# Patient Record
Sex: FEMALE | Race: WHITE | Employment: OTHER | URBAN - METROPOLITAN AREA
[De-identification: names, ages, dates, MRNs, and addresses within clinical notes are randomized per-mention and may not be internally consistent; named-entity substitution may affect disease eponyms.]

---

## 2021-10-25 ENCOUNTER — LAB ENCOUNTER (OUTPATIENT)
Dept: LAB | Facility: HOSPITAL | Age: 71
DRG: 455 | End: 2021-10-25
Attending: ORTHOPAEDIC SURGERY
Payer: MEDICARE

## 2021-10-25 DIAGNOSIS — Z01.818 PREOP TESTING: ICD-10-CM

## 2021-10-25 PROCEDURE — 86850 RBC ANTIBODY SCREEN: CPT

## 2021-10-25 PROCEDURE — 36415 COLL VENOUS BLD VENIPUNCTURE: CPT

## 2021-10-25 PROCEDURE — 86900 BLOOD TYPING SEROLOGIC ABO: CPT

## 2021-10-25 PROCEDURE — 87641 MR-STAPH DNA AMP PROBE: CPT

## 2021-10-25 PROCEDURE — 86901 BLOOD TYPING SEROLOGIC RH(D): CPT

## 2021-10-25 RX ORDER — SERTRALINE HYDROCHLORIDE 100 MG/1
100 TABLET, FILM COATED ORAL EVERY MORNING
COMMUNITY

## 2021-10-25 RX ORDER — TRAMADOL HYDROCHLORIDE 50 MG/1
50 TABLET ORAL EVERY 6 HOURS PRN
COMMUNITY

## 2021-10-25 RX ORDER — PREGABALIN 75 MG/1
75 CAPSULE ORAL 2 TIMES DAILY
COMMUNITY

## 2021-10-25 RX ORDER — SUMATRIPTAN 100 MG/1
100 TABLET, FILM COATED ORAL EVERY 2 HOUR PRN
COMMUNITY

## 2021-10-25 RX ORDER — ALENDRONATE SODIUM 70 MG/1
70 TABLET ORAL
COMMUNITY

## 2021-10-25 RX ORDER — HYDROXYZINE 50 MG/1
50 TABLET, FILM COATED ORAL 3 TIMES DAILY PRN
COMMUNITY
End: 2021-12-07

## 2021-10-25 RX ORDER — LOVASTATIN 20 MG/1
20 TABLET ORAL NIGHTLY
COMMUNITY

## 2021-10-26 NOTE — PAT NURSING NOTE
LM with Abundio Finney at Dr Janelle Nicole [facility] at  re: medical clearance and cardiac clearance needed prior to surgery tomorrow. Pls see notes of Dr Tracy Lindquist on 9/29/21 at [facility].

## 2021-10-27 ENCOUNTER — ANESTHESIA (OUTPATIENT)
Dept: SURGERY | Facility: HOSPITAL | Age: 71
DRG: 455 | End: 2021-10-27
Payer: MEDICARE

## 2021-10-27 ENCOUNTER — HOSPITAL ENCOUNTER (INPATIENT)
Facility: HOSPITAL | Age: 71
LOS: 3 days | Discharge: HOME | DRG: 455 | End: 2021-10-30
Attending: ORTHOPAEDIC SURGERY | Admitting: ORTHOPAEDIC SURGERY
Payer: MEDICARE

## 2021-10-27 ENCOUNTER — APPOINTMENT (OUTPATIENT)
Dept: GENERAL RADIOLOGY | Facility: HOSPITAL | Age: 71
DRG: 455 | End: 2021-10-27
Attending: ORTHOPAEDIC SURGERY
Payer: MEDICARE

## 2021-10-27 ENCOUNTER — ANESTHESIA EVENT (OUTPATIENT)
Dept: SURGERY | Facility: HOSPITAL | Age: 71
DRG: 455 | End: 2021-10-27
Payer: MEDICARE

## 2021-10-27 DIAGNOSIS — Z01.818 PREOP TESTING: Primary | ICD-10-CM

## 2021-10-27 PROBLEM — Z98.1 S/P LUMBAR FUSION: Status: ACTIVE | Noted: 2021-10-27

## 2021-10-27 PROBLEM — E78.5 HYPERLIPIDEMIA: Chronic | Status: ACTIVE | Noted: 2021-10-27

## 2021-10-27 PROBLEM — M43.16 SPONDYLOLISTHESIS OF LUMBAR REGION: Status: ACTIVE | Noted: 2021-10-27

## 2021-10-27 PROCEDURE — 76000 FLUOROSCOPY <1 HR PHYS/QHP: CPT | Performed by: ORTHOPAEDIC SURGERY

## 2021-10-27 PROCEDURE — BR191ZZ FLUOROSCOPY OF LUMBAR SPINE USING LOW OSMOLAR CONTRAST: ICD-10-PCS | Performed by: ORTHOPAEDIC SURGERY

## 2021-10-27 PROCEDURE — 0ST20ZZ RESECTION OF LUMBAR VERTEBRAL DISC, OPEN APPROACH: ICD-10-PCS | Performed by: ORTHOPAEDIC SURGERY

## 2021-10-27 PROCEDURE — 99232 SBSQ HOSP IP/OBS MODERATE 35: CPT | Performed by: HOSPITALIST

## 2021-10-27 PROCEDURE — 4A11X4G MONITORING OF PERIPHERAL NERVOUS ELECTRICAL ACTIVITY, INTRAOPERATIVE, EXTERNAL APPROACH: ICD-10-PCS | Performed by: ORTHOPAEDIC SURGERY

## 2021-10-27 PROCEDURE — 22558 ARTHRD ANT NTRBD MIN DSC LUM: CPT | Performed by: SURGERY

## 2021-10-27 PROCEDURE — 0SG00A0 FUSION OF LUMBAR VERTEBRAL JOINT WITH INTERBODY FUSION DEVICE, ANTERIOR APPROACH, ANTERIOR COLUMN, OPEN APPROACH: ICD-10-PCS | Performed by: ORTHOPAEDIC SURGERY

## 2021-10-27 PROCEDURE — 3E0U0GB INTRODUCTION OF RECOMBINANT BONE MORPHOGENETIC PROTEIN INTO JOINTS, OPEN APPROACH: ICD-10-PCS | Performed by: ORTHOPAEDIC SURGERY

## 2021-10-27 PROCEDURE — 0SG00K1 FUSION OF LUMBAR VERTEBRAL JOINT WITH NONAUTOLOGOUS TISSUE SUBSTITUTE, POSTERIOR APPROACH, POSTERIOR COLUMN, OPEN APPROACH: ICD-10-PCS | Performed by: ORTHOPAEDIC SURGERY

## 2021-10-27 PROCEDURE — 01NB0ZZ RELEASE LUMBAR NERVE, OPEN APPROACH: ICD-10-PCS | Performed by: ORTHOPAEDIC SURGERY

## 2021-10-27 DEVICE — RELINE MAS RED SCREW 6.5X40 2C: Type: IMPLANTABLE DEVICE | Site: BACK | Status: FUNCTIONAL

## 2021-10-27 DEVICE — ATTRAX® PUTTY, 10CC US
Type: IMPLANTABLE DEVICE | Site: BACK | Status: FUNCTIONAL
Brand: ATTRAX

## 2021-10-27 DEVICE — RELINE MAS RED SCREW 6.5X45 2C: Type: IMPLANTABLE DEVICE | Site: BACK | Status: FUNCTIONAL

## 2021-10-27 DEVICE — BONE GRAFT KIT 7510050 INFUSE XX SMALL
Type: IMPLANTABLE DEVICE | Site: BACK | Status: FUNCTIONAL
Brand: INFUSE® BONE GRAFT

## 2021-10-27 DEVICE — MODULUS ALIF, 8X34X24MM 15°
Type: IMPLANTABLE DEVICE | Site: BACK | Status: FUNCTIONAL
Brand: MODULUS

## 2021-10-27 DEVICE — RELINE MAS TI ROD 5.5X35 LRDTC: Type: IMPLANTABLE DEVICE | Site: BACK | Status: FUNCTIONAL

## 2021-10-27 DEVICE — RELINE LCK SCRW 5.5 OPEN TULIP: Type: IMPLANTABLE DEVICE | Site: BACK | Status: FUNCTIONAL

## 2021-10-27 RX ORDER — PREGABALIN 75 MG/1
75 CAPSULE ORAL 2 TIMES DAILY
Status: DISCONTINUED | OUTPATIENT
Start: 2021-10-27 | End: 2021-10-30

## 2021-10-27 RX ORDER — SERTRALINE HYDROCHLORIDE 100 MG/1
100 TABLET, FILM COATED ORAL EVERY MORNING
Status: DISCONTINUED | OUTPATIENT
Start: 2021-10-27 | End: 2021-10-30

## 2021-10-27 RX ORDER — HYDROMORPHONE HYDROCHLORIDE 1 MG/ML
0.6 INJECTION, SOLUTION INTRAMUSCULAR; INTRAVENOUS; SUBCUTANEOUS EVERY 5 MIN PRN
Status: DISCONTINUED | OUTPATIENT
Start: 2021-10-27 | End: 2021-10-27 | Stop reason: HOSPADM

## 2021-10-27 RX ORDER — ONDANSETRON 2 MG/ML
4 INJECTION INTRAMUSCULAR; INTRAVENOUS EVERY 4 HOURS PRN
Status: ACTIVE | OUTPATIENT
Start: 2021-10-27 | End: 2021-10-28

## 2021-10-27 RX ORDER — ONDANSETRON 2 MG/ML
4 INJECTION INTRAMUSCULAR; INTRAVENOUS ONCE AS NEEDED
Status: DISCONTINUED | OUTPATIENT
Start: 2021-10-27 | End: 2021-10-27 | Stop reason: HOSPADM

## 2021-10-27 RX ORDER — MIDAZOLAM HYDROCHLORIDE 1 MG/ML
INJECTION INTRAMUSCULAR; INTRAVENOUS AS NEEDED
Status: DISCONTINUED | OUTPATIENT
Start: 2021-10-27 | End: 2021-10-27 | Stop reason: SURG

## 2021-10-27 RX ORDER — MORPHINE SULFATE 4 MG/ML
2 INJECTION, SOLUTION INTRAMUSCULAR; INTRAVENOUS EVERY 10 MIN PRN
Status: DISCONTINUED | OUTPATIENT
Start: 2021-10-27 | End: 2021-10-27 | Stop reason: HOSPADM

## 2021-10-27 RX ORDER — HYDROMORPHONE HYDROCHLORIDE 1 MG/ML
0.2 INJECTION, SOLUTION INTRAMUSCULAR; INTRAVENOUS; SUBCUTANEOUS EVERY 5 MIN PRN
Status: DISCONTINUED | OUTPATIENT
Start: 2021-10-27 | End: 2021-10-27 | Stop reason: HOSPADM

## 2021-10-27 RX ORDER — LIDOCAINE HYDROCHLORIDE 10 MG/ML
INJECTION, SOLUTION EPIDURAL; INFILTRATION; INTRACAUDAL; PERINEURAL AS NEEDED
Status: DISCONTINUED | OUTPATIENT
Start: 2021-10-27 | End: 2021-10-27 | Stop reason: SURG

## 2021-10-27 RX ORDER — GLYCOPYRROLATE 0.2 MG/ML
INJECTION, SOLUTION INTRAMUSCULAR; INTRAVENOUS AS NEEDED
Status: DISCONTINUED | OUTPATIENT
Start: 2021-10-27 | End: 2021-10-27 | Stop reason: SURG

## 2021-10-27 RX ORDER — PRAVASTATIN SODIUM 20 MG
20 TABLET ORAL NIGHTLY
Status: DISCONTINUED | OUTPATIENT
Start: 2021-10-27 | End: 2021-10-30

## 2021-10-27 RX ORDER — DOXEPIN HYDROCHLORIDE 50 MG/1
1 CAPSULE ORAL EVERY MORNING
Status: DISCONTINUED | OUTPATIENT
Start: 2021-10-27 | End: 2021-10-30

## 2021-10-27 RX ORDER — ROCURONIUM BROMIDE 10 MG/ML
INJECTION, SOLUTION INTRAVENOUS AS NEEDED
Status: DISCONTINUED | OUTPATIENT
Start: 2021-10-27 | End: 2021-10-27 | Stop reason: SURG

## 2021-10-27 RX ORDER — SODIUM CHLORIDE, SODIUM LACTATE, POTASSIUM CHLORIDE, CALCIUM CHLORIDE 600; 310; 30; 20 MG/100ML; MG/100ML; MG/100ML; MG/100ML
INJECTION, SOLUTION INTRAVENOUS CONTINUOUS
Status: DISCONTINUED | OUTPATIENT
Start: 2021-10-27 | End: 2021-10-28

## 2021-10-27 RX ORDER — MORPHINE SULFATE 4 MG/ML
4 INJECTION, SOLUTION INTRAMUSCULAR; INTRAVENOUS EVERY 10 MIN PRN
Status: DISCONTINUED | OUTPATIENT
Start: 2021-10-27 | End: 2021-10-27 | Stop reason: HOSPADM

## 2021-10-27 RX ORDER — PHENYLEPHRINE HCL 10 MG/ML
VIAL (ML) INJECTION AS NEEDED
Status: DISCONTINUED | OUTPATIENT
Start: 2021-10-27 | End: 2021-10-27 | Stop reason: SURG

## 2021-10-27 RX ORDER — BISACODYL 10 MG
10 SUPPOSITORY, RECTAL RECTAL
Status: DISCONTINUED | OUTPATIENT
Start: 2021-10-27 | End: 2021-10-30

## 2021-10-27 RX ORDER — HYDROMORPHONE HYDROCHLORIDE 1 MG/ML
0.4 INJECTION, SOLUTION INTRAMUSCULAR; INTRAVENOUS; SUBCUTANEOUS EVERY 5 MIN PRN
Status: DISCONTINUED | OUTPATIENT
Start: 2021-10-27 | End: 2021-10-27 | Stop reason: HOSPADM

## 2021-10-27 RX ORDER — NEOSTIGMINE METHYLSULFATE 1 MG/ML
INJECTION INTRAVENOUS AS NEEDED
Status: DISCONTINUED | OUTPATIENT
Start: 2021-10-27 | End: 2021-10-27 | Stop reason: SURG

## 2021-10-27 RX ORDER — HYDROCODONE BITARTRATE AND ACETAMINOPHEN 5; 325 MG/1; MG/1
2 TABLET ORAL AS NEEDED
Status: DISCONTINUED | OUTPATIENT
Start: 2021-10-27 | End: 2021-10-27 | Stop reason: HOSPADM

## 2021-10-27 RX ORDER — DIPHENHYDRAMINE HYDROCHLORIDE 50 MG/ML
25 INJECTION INTRAMUSCULAR; INTRAVENOUS EVERY 4 HOURS PRN
Status: DISCONTINUED | OUTPATIENT
Start: 2021-10-27 | End: 2021-10-30

## 2021-10-27 RX ORDER — SODIUM CHLORIDE 9 MG/ML
INJECTION, SOLUTION INTRAVENOUS CONTINUOUS PRN
Status: DISCONTINUED | OUTPATIENT
Start: 2021-10-27 | End: 2021-10-27 | Stop reason: SURG

## 2021-10-27 RX ORDER — ACETAMINOPHEN 325 MG/1
650 TABLET ORAL EVERY 4 HOURS PRN
Status: DISCONTINUED | OUTPATIENT
Start: 2021-10-27 | End: 2021-10-30

## 2021-10-27 RX ORDER — HYDROMORPHONE HYDROCHLORIDE 1 MG/ML
0.2 INJECTION, SOLUTION INTRAMUSCULAR; INTRAVENOUS; SUBCUTANEOUS EVERY 2 HOUR PRN
Status: DISCONTINUED | OUTPATIENT
Start: 2021-10-27 | End: 2021-10-30

## 2021-10-27 RX ORDER — HYDROMORPHONE HYDROCHLORIDE 1 MG/ML
0.8 INJECTION, SOLUTION INTRAMUSCULAR; INTRAVENOUS; SUBCUTANEOUS EVERY 2 HOUR PRN
Status: DISCONTINUED | OUTPATIENT
Start: 2021-10-27 | End: 2021-10-30

## 2021-10-27 RX ORDER — SENNOSIDES 8.6 MG
17.2 TABLET ORAL NIGHTLY
Status: DISCONTINUED | OUTPATIENT
Start: 2021-10-27 | End: 2021-10-30

## 2021-10-27 RX ORDER — NALOXONE HYDROCHLORIDE 0.4 MG/ML
80 INJECTION, SOLUTION INTRAMUSCULAR; INTRAVENOUS; SUBCUTANEOUS AS NEEDED
Status: DISCONTINUED | OUTPATIENT
Start: 2021-10-27 | End: 2021-10-27 | Stop reason: HOSPADM

## 2021-10-27 RX ORDER — PROCHLORPERAZINE EDISYLATE 5 MG/ML
5 INJECTION INTRAMUSCULAR; INTRAVENOUS ONCE AS NEEDED
Status: DISCONTINUED | OUTPATIENT
Start: 2021-10-27 | End: 2021-10-27 | Stop reason: HOSPADM

## 2021-10-27 RX ORDER — SODIUM CHLORIDE 9 MG/ML
INJECTION, SOLUTION INTRAVENOUS CONTINUOUS
Status: DISCONTINUED | OUTPATIENT
Start: 2021-10-27 | End: 2021-10-30

## 2021-10-27 RX ORDER — ACETAMINOPHEN 500 MG
1000 TABLET ORAL ONCE
Status: COMPLETED | OUTPATIENT
Start: 2021-10-27 | End: 2021-10-27

## 2021-10-27 RX ORDER — KETAMINE HYDROCHLORIDE 50 MG/ML
INJECTION, SOLUTION, CONCENTRATE INTRAMUSCULAR; INTRAVENOUS AS NEEDED
Status: DISCONTINUED | OUTPATIENT
Start: 2021-10-27 | End: 2021-10-27 | Stop reason: SURG

## 2021-10-27 RX ORDER — PROCHLORPERAZINE EDISYLATE 5 MG/ML
10 INJECTION INTRAMUSCULAR; INTRAVENOUS EVERY 6 HOURS PRN
Status: ACTIVE | OUTPATIENT
Start: 2021-10-27 | End: 2021-10-29

## 2021-10-27 RX ORDER — DEXAMETHASONE SODIUM PHOSPHATE 4 MG/ML
VIAL (ML) INJECTION AS NEEDED
Status: DISCONTINUED | OUTPATIENT
Start: 2021-10-27 | End: 2021-10-27 | Stop reason: SURG

## 2021-10-27 RX ORDER — EPHEDRINE SULFATE 50 MG/ML
INJECTION, SOLUTION INTRAVENOUS AS NEEDED
Status: DISCONTINUED | OUTPATIENT
Start: 2021-10-27 | End: 2021-10-27 | Stop reason: SURG

## 2021-10-27 RX ORDER — CEFAZOLIN SODIUM/WATER 2 G/20 ML
2 SYRINGE (ML) INTRAVENOUS ONCE
Status: COMPLETED | OUTPATIENT
Start: 2021-10-27 | End: 2021-10-27

## 2021-10-27 RX ORDER — ONDANSETRON 2 MG/ML
INJECTION INTRAMUSCULAR; INTRAVENOUS AS NEEDED
Status: DISCONTINUED | OUTPATIENT
Start: 2021-10-27 | End: 2021-10-27 | Stop reason: SURG

## 2021-10-27 RX ORDER — HYDROCODONE BITARTRATE AND ACETAMINOPHEN 10; 325 MG/1; MG/1
1 TABLET ORAL EVERY 4 HOURS PRN
Status: DISCONTINUED | OUTPATIENT
Start: 2021-10-27 | End: 2021-10-30

## 2021-10-27 RX ORDER — DOCUSATE SODIUM 100 MG/1
100 CAPSULE, LIQUID FILLED ORAL 2 TIMES DAILY
Status: DISCONTINUED | OUTPATIENT
Start: 2021-10-27 | End: 2021-10-30

## 2021-10-27 RX ORDER — HALOPERIDOL 5 MG/ML
0.25 INJECTION INTRAMUSCULAR ONCE AS NEEDED
Status: DISCONTINUED | OUTPATIENT
Start: 2021-10-27 | End: 2021-10-27 | Stop reason: HOSPADM

## 2021-10-27 RX ORDER — HYDROXYZINE HYDROCHLORIDE 25 MG/1
50 TABLET, FILM COATED ORAL 3 TIMES DAILY PRN
Status: DISCONTINUED | OUTPATIENT
Start: 2021-10-27 | End: 2021-10-30

## 2021-10-27 RX ORDER — POLYETHYLENE GLYCOL 3350 17 G/17G
17 POWDER, FOR SOLUTION ORAL DAILY PRN
Status: DISCONTINUED | OUTPATIENT
Start: 2021-10-27 | End: 2021-10-30

## 2021-10-27 RX ORDER — SODIUM PHOSPHATE, DIBASIC AND SODIUM PHOSPHATE, MONOBASIC 7; 19 G/133ML; G/133ML
1 ENEMA RECTAL ONCE AS NEEDED
Status: DISCONTINUED | OUTPATIENT
Start: 2021-10-27 | End: 2021-10-30

## 2021-10-27 RX ORDER — MORPHINE SULFATE 10 MG/ML
6 INJECTION, SOLUTION INTRAMUSCULAR; INTRAVENOUS EVERY 10 MIN PRN
Status: DISCONTINUED | OUTPATIENT
Start: 2021-10-27 | End: 2021-10-27 | Stop reason: HOSPADM

## 2021-10-27 RX ORDER — BUPIVACAINE HYDROCHLORIDE 5 MG/ML
INJECTION, SOLUTION EPIDURAL; INTRACAUDAL AS NEEDED
Status: DISCONTINUED | OUTPATIENT
Start: 2021-10-27 | End: 2021-10-27 | Stop reason: HOSPADM

## 2021-10-27 RX ORDER — DIPHENHYDRAMINE HCL 25 MG
25 CAPSULE ORAL EVERY 4 HOURS PRN
Status: DISCONTINUED | OUTPATIENT
Start: 2021-10-27 | End: 2021-10-30

## 2021-10-27 RX ORDER — CEFAZOLIN SODIUM/WATER 2 G/20 ML
2 SYRINGE (ML) INTRAVENOUS EVERY 8 HOURS
Status: COMPLETED | OUTPATIENT
Start: 2021-10-27 | End: 2021-10-28

## 2021-10-27 RX ORDER — HYDROCODONE BITARTRATE AND ACETAMINOPHEN 5; 325 MG/1; MG/1
1 TABLET ORAL AS NEEDED
Status: DISCONTINUED | OUTPATIENT
Start: 2021-10-27 | End: 2021-10-27 | Stop reason: HOSPADM

## 2021-10-27 RX ORDER — SODIUM CHLORIDE, SODIUM LACTATE, POTASSIUM CHLORIDE, CALCIUM CHLORIDE 600; 310; 30; 20 MG/100ML; MG/100ML; MG/100ML; MG/100ML
INJECTION, SOLUTION INTRAVENOUS CONTINUOUS
Status: DISCONTINUED | OUTPATIENT
Start: 2021-10-27 | End: 2021-10-27 | Stop reason: HOSPADM

## 2021-10-27 RX ORDER — HYDROCODONE BITARTRATE AND ACETAMINOPHEN 10; 325 MG/1; MG/1
2 TABLET ORAL EVERY 4 HOURS PRN
Status: DISCONTINUED | OUTPATIENT
Start: 2021-10-27 | End: 2021-10-30

## 2021-10-27 RX ORDER — CYCLOBENZAPRINE HCL 5 MG
5 TABLET ORAL 3 TIMES DAILY PRN
Status: DISCONTINUED | OUTPATIENT
Start: 2021-10-27 | End: 2021-10-30

## 2021-10-27 RX ORDER — HYDROMORPHONE HYDROCHLORIDE 1 MG/ML
0.4 INJECTION, SOLUTION INTRAMUSCULAR; INTRAVENOUS; SUBCUTANEOUS EVERY 2 HOUR PRN
Status: DISCONTINUED | OUTPATIENT
Start: 2021-10-27 | End: 2021-10-30

## 2021-10-27 RX ADMIN — DEXAMETHASONE SODIUM PHOSPHATE 4 MG: 4 MG/ML VIAL (ML) INJECTION at 08:08:00

## 2021-10-27 RX ADMIN — ROCURONIUM BROMIDE 40 MG: 10 INJECTION, SOLUTION INTRAVENOUS at 07:51:00

## 2021-10-27 RX ADMIN — ONDANSETRON 4 MG: 2 INJECTION INTRAMUSCULAR; INTRAVENOUS at 11:04:00

## 2021-10-27 RX ADMIN — SODIUM CHLORIDE, SODIUM LACTATE, POTASSIUM CHLORIDE, CALCIUM CHLORIDE: 600; 310; 30; 20 INJECTION, SOLUTION INTRAVENOUS at 07:42:00

## 2021-10-27 RX ADMIN — KETAMINE HYDROCHLORIDE 30 MG: 50 INJECTION, SOLUTION, CONCENTRATE INTRAMUSCULAR; INTRAVENOUS at 08:08:00

## 2021-10-27 RX ADMIN — GLYCOPYRROLATE 0.5 MG: 0.2 INJECTION, SOLUTION INTRAMUSCULAR; INTRAVENOUS at 09:59:00

## 2021-10-27 RX ADMIN — SODIUM CHLORIDE: 9 INJECTION, SOLUTION INTRAVENOUS at 09:35:00

## 2021-10-27 RX ADMIN — KETAMINE HYDROCHLORIDE 15 MG: 50 INJECTION, SOLUTION, CONCENTRATE INTRAMUSCULAR; INTRAVENOUS at 10:22:00

## 2021-10-27 RX ADMIN — SODIUM CHLORIDE, SODIUM LACTATE, POTASSIUM CHLORIDE, CALCIUM CHLORIDE: 600; 310; 30; 20 INJECTION, SOLUTION INTRAVENOUS at 11:27:00

## 2021-10-27 RX ADMIN — PHENYLEPHRINE HCL 100 MCG: 10 MG/ML VIAL (ML) INJECTION at 10:40:00

## 2021-10-27 RX ADMIN — PHENYLEPHRINE HCL 50 MCG: 10 MG/ML VIAL (ML) INJECTION at 09:32:00

## 2021-10-27 RX ADMIN — EPHEDRINE SULFATE 10 MG: 50 INJECTION, SOLUTION INTRAVENOUS at 07:53:00

## 2021-10-27 RX ADMIN — EPHEDRINE SULFATE 5 MG: 50 INJECTION, SOLUTION INTRAVENOUS at 09:52:00

## 2021-10-27 RX ADMIN — CEFAZOLIN SODIUM/WATER 2 G: 2 G/20 ML SYRINGE (ML) INTRAVENOUS at 11:27:00

## 2021-10-27 RX ADMIN — ROCURONIUM BROMIDE 10 MG: 10 INJECTION, SOLUTION INTRAVENOUS at 09:07:00

## 2021-10-27 RX ADMIN — LIDOCAINE HYDROCHLORIDE 50 MG: 10 INJECTION, SOLUTION EPIDURAL; INFILTRATION; INTRACAUDAL; PERINEURAL at 07:50:00

## 2021-10-27 RX ADMIN — GLYCOPYRROLATE 0.1 MG: 0.2 INJECTION, SOLUTION INTRAMUSCULAR; INTRAVENOUS at 08:38:00

## 2021-10-27 RX ADMIN — CEFAZOLIN SODIUM/WATER 2 G: 2 G/20 ML SYRINGE (ML) INTRAVENOUS at 07:47:00

## 2021-10-27 RX ADMIN — SODIUM CHLORIDE, SODIUM LACTATE, POTASSIUM CHLORIDE, CALCIUM CHLORIDE: 600; 310; 30; 20 INJECTION, SOLUTION INTRAVENOUS at 09:35:00

## 2021-10-27 RX ADMIN — PHENYLEPHRINE HCL 100 MCG: 10 MG/ML VIAL (ML) INJECTION at 09:52:00

## 2021-10-27 RX ADMIN — MIDAZOLAM HYDROCHLORIDE 1 MG: 1 INJECTION INTRAMUSCULAR; INTRAVENOUS at 07:42:00

## 2021-10-27 RX ADMIN — EPHEDRINE SULFATE 5 MG: 50 INJECTION, SOLUTION INTRAVENOUS at 09:17:00

## 2021-10-27 RX ADMIN — EPHEDRINE SULFATE 5 MG: 50 INJECTION, SOLUTION INTRAVENOUS at 08:29:00

## 2021-10-27 RX ADMIN — NEOSTIGMINE METHYLSULFATE 2.8 MG: 1 INJECTION INTRAVENOUS at 09:59:00

## 2021-10-27 RX ADMIN — MIDAZOLAM HYDROCHLORIDE 2 MG: 1 INJECTION INTRAMUSCULAR; INTRAVENOUS at 10:31:00

## 2021-10-27 RX ADMIN — SODIUM CHLORIDE: 9 INJECTION, SOLUTION INTRAVENOUS at 07:56:00

## 2021-10-27 NOTE — OPERATIVE REPORT
Date of Surgery  10/27/21     Preoperative Diagnosis   Lumbar degenerative disc disease  Lumbar spondylolisthesis L4-5  Lumbar spinal stenosis  Lumbar radiculopathy  Low back pain     Postoperative Diagnosis   Lumbar degenerative disc disease  Lumbar spond disease, need for future surgery, continued pain, cardiopulmonary complications, blood clot, pulmonary embolism, stroke, adverse reaction to anesthesia, death, blindness, infection, seroma, hematoma. All questions were answered. No guarantees were given. padded. A timeout was preformed for Stage I identifying the correct patient, procedure, site and side. Dr. Yanick Causey performed the anterior retroperitoneal exposure which will be dictated separately.  Once the L4-5 disc space was confirmed to be exposed in the o foramen and spinal canal and thus direct open decompressive laminectomy was not chosen to be preformed at this time. The patient was then carefully turned to the prone position onto an [place] spine table for Stage II.  All bony prominences were well pad body. Once the wires were placed in the left L4 and L5 levels we then proceed to the right side. A #15 scalpel blade was used to make a longitudinal incision from the level of the L4 to the L5 pedicle thru the skin now on the right.  The dissection was cruz repaired using 0 Vicryl suture. The deep and superficial fascial layers were closed using 2-0 Vicryl suture. The skin was closed with 3-0 Monocryl running subcuticular suture. Skin glue and a sterile dressing was applied.  The patient tolerated the procedur

## 2021-10-27 NOTE — BRIEF OP NOTE
Pre-Operative Diagnosis: Lumbar radiculopathy     Post-Operative Diagnosis: Lumbar radiculopathy      Procedure Performed:   L4-5 anterior lumbar interbody fusion with L4-5 posterior instrumentation and fusion      Surgeon(s) and Role:     * aPco Swanson

## 2021-10-27 NOTE — ANESTHESIA PROCEDURE NOTES
Airway  Date/Time: 10/27/2021 7:53 AM  Urgency: Elective      General Information and Staff    Patient location during procedure: OR  Anesthesiologist: Savannah Guerin MD  Resident/CRNA: Pito Lee CRNA  Performed: CRNA     Indications and Patient Con

## 2021-10-27 NOTE — PLAN OF CARE
Patient alert and oriented x4. Pain controlled with meds from PACU. Castillo catheter in place. Abdominal and lumbar surgical dressings clean dry and intact. Patient educated on cough and deep breathe and proper safety protocols. SCDs in place.  Spouse at beds of cardiac arrhythmias or at baseline  Description: INTERVENTIONS:  - Continuous cardiac monitoring, monitor vital signs, obtain 12 lead EKG if indicated  - Evaluate effectiveness of antiarrhythmic and heart rate control medications as ordered  - Initiate transferring and ambulating w/ or w/o assistive devices  - Assist with transfers and ambulation using safe patient handling equipment as needed  - Ensure adequate protection for wounds/incisions during mobilization  - Obtain PT/OT consults as needed  - Adv precautions as indicated by assessment.  - Educate pt/family on patient safety including physical limitations  - Instruct pt to call for assistance with activity based on assessment  - Modify environment to reduce risk of injury  - Provide assistive device

## 2021-10-27 NOTE — ANESTHESIA POSTPROCEDURE EVALUATION
Patient: Francisco Tompkins    Procedure Summary     Date: 10/27/21 Room / Location: [address] [place] [address] MAIN OR 12 / [address] [place] [address] MAIN OR    Anesthesia Start: 4692 Anesthesia Stop: 6460    Procedures:       L4-5 anterior lumbar interbody fusion with posterior instrumentation

## 2021-10-27 NOTE — ANESTHESIA PREPROCEDURE EVALUATION
Anesthesia PreOp Note    HPI:     Rosy Cesar is a 66year old female who presents for preoperative consultation requested by: Kimberley Avila DO    Date of Surgery: 10/27/2021    Procedure(s):  L4-5 anterior lumbar interbody fusion wi every 6 (six) hours as needed for Pain., Disp: , Rfl: , 10/24/2021  hydrOXYzine 50 MG Oral Tab, Take 50 mg by mouth 3 (three) times daily as needed for Itching (Nausea). , Disp: , Rfl: , More than a month at Unknown time  Multiple Vitamin (MULTIVITAMIN TAB/     Days of Exercise per Week: Not on file      Minutes of Exercise per Session: Not on file  Stress:       Feeling of Stress : Not on file  Social Connections:       Frequency of Communication with Friends and Family: Not on file      Frequency of Socia Abdominal  - normal exam    Abdomen: soft.   Bowel sounds: normal.             Anesthesia Plan:   ASA:  2  Plan:   General  Monitors and Lines:   Additonal IV  Airway:  ETT  Informed Consent Plan and Risks Discussed With:  Patient and spouse  Discussed plan

## 2021-10-27 NOTE — PHYSICAL THERAPY NOTE
PHYSICAL THERAPY EVALUATION - INPATIENT     Room Number: 407/407-A  Evaluation Date: 10/27/2021  Type of Evaluation: Initial   Physician Order: PT Eval and Treat    Presenting Problem: L4-5 Anterior & Posterior fusion  Reason for Therapy: Mobility Dysfunc training;Strengthening;Neuromuscular re-educate  Rehab Potential : Fair  Frequency (Obs): BID       PHYSICAL THERAPY MEDICAL/SOCIAL HISTORY     History related to current admission: Per H&P: \"Sherin Poole is a(n) 66year old female.  Patient was  for LE dressing. She amb w/o AD. She stopped driving during [place]. She needs to get surgery on her L hand. She does yoga. SUBJECTIVE  Pt does not want to use RW at home.      PHYSICAL THERAPY EXAMINATION     OBJECTIVE  Precautions: Spine  Fall Ri ABILITY STATUS  Gait Assessment   Gait Assistance: Minimum assistance  Distance (ft): 40  Assistive Device: Rolling walker     Stoop/Curb Assistance: Not tested       Bed Mobility: CGA    Transfers: Min A    Exercise/Education Provided:  Bed mobility  Body

## 2021-10-27 NOTE — PROGRESS NOTES
[facility]    Progress Note    Gabriel Cosme Patient Status:  Inpatient    1950 MRN [id number]   [facility] Attending Aimee Gaytan [address] Day # 0 PCP Jose Garcia, BILT, TP, AST, ALT, PTT, INR, PT, T4F, TSH, TSHREFLEX, NARCISO, LIP, GGT, PSA, DDIMER, ESRML, ESRPF, CRP, BNP, MG, PHOS, TROP, CK, CKMB, JUANPABLO, RPR, B12, ETOH, POCGLU    XR FLUOROSCOPY C-ARM TIME <1 HOUR  (CPT=76000)    Result Date: 10/27/2021  CONCLUSION:   FLU

## 2021-10-27 NOTE — ANESTHESIA PREPROCEDURE EVALUATION
Anesthesia PreOp Note    HPI:     Holly Puentes is a 66year old female who presents for preoperative consultation requested by: Julita Richardson DO    Date of Surgery: 10/27/2021    Procedure(s):  L4-5 anterior lumbar interbody fusion wi every 6 (six) hours as needed for Pain., Disp: , Rfl: , 10/24/2021  hydrOXYzine 50 MG Oral Tab, Take 50 mg by mouth 3 (three) times daily as needed for Itching (Nausea). , Disp: , Rfl: , More than a month at Unknown time  Multiple Vitamin (MULTIVITAMIN TAB/     Days of Exercise per Week: Not on file      Minutes of Exercise per Session: Not on file  Stress:       Feeling of Stress : Not on file  Social Connections:       Frequency of Communication with Friends and Family: Not on file      Frequency of Socia were answered to the best of my ability. The patient desires the anesthetic management as planned.   Edmund Apley, CRNA  10/27/2021 7:23 AM

## 2021-10-27 NOTE — H&P
[address] Patient Status:  Inpatient    1950 MRN [id number]   Location [facility] Attending Griffin May [address] Day # 0 PCP Miya Collins DO days.  Lovastatin 20 MG Oral Tab, Take 20 mg by mouth nightly. pregabalin 75 MG Oral Cap, Take 75 mg by mouth 2 (two) times daily. sertraline 100 MG Oral Tab, Take 100 mg by mouth every morning.   SUMAtriptan Succinate 100 MG Oral Tab, Take 100 mg by mout operative course were discussed with the patient and her . We discussed the unique risks of BMP. She consented to its use. They wish to proceed with the above mentioned procedure.       Connie Swanson, DO  10/27/2021

## 2021-10-27 NOTE — INTERVAL H&P NOTE
Pre-op Diagnosis: Lumbar radiculopathy    The above referenced H&P was reviewed by Jasmin Lowery DO on 10/27/2021, the patient was examined and no significant changes have occurred in the patient's condition since the H&P was performed.   I discu

## 2021-10-28 PROCEDURE — 99232 SBSQ HOSP IP/OBS MODERATE 35: CPT | Performed by: HOSPITALIST

## 2021-10-28 RX ORDER — POTASSIUM CHLORIDE 20 MEQ/1
10 TABLET, EXTENDED RELEASE ORAL DAILY
Status: DISCONTINUED | OUTPATIENT
Start: 2021-10-28 | End: 2021-10-30

## 2021-10-28 NOTE — OCCUPATIONAL THERAPY NOTE
OCCUPATIONAL THERAPY EVALUATION - INPATIENT     Room Number: 407/407-A  Evaluation Date: 10/28/2021  Type of Evaluation: Quick Eval  Presenting Problem:  (spondylolisthesis of lumbar region)    Physician Order: IP Consult to Occupational Therapy  Reason List  Principal Problem:    Spondylolisthesis of lumbar region  Active Problems:    Hyperlipidemia    S/P lumbar fusion      Past Medical History  Past Medical History:   Diagnosis Date   • Back problem    • Calculus of kidney    • Cataract    • Depression within functional limits    ACTIVITIES OF DAILY LIVING ASSESSMENT  AM-PAC ‘6-Clicks’ Inpatient Daily Activity Short Form  How much help from another person does the patient currently need…  -   Putting on and taking off regular lower body clothing?: A Elena

## 2021-10-28 NOTE — PROGRESS NOTES
[place] [facility]    Progress Note    Mookie Harris Patient Status:  Inpatient    1950 MRN [id number]   Location [facility] 4W/SW/SE Attending Sheryl Ritchie [address] Day # 1 PCP Prachi Reasons, DO        Brain Cooler Results:     Lab Results   Component Value Date    WBC 8.9 10/28/2021    HGB 9.9 (L) 10/28/2021    HCT 30.6 (L) 10/28/2021    .0 (L) 10/28/2021    CREATSERUM 0.50 (L) 10/28/2021    BUN 7 10/28/2021     10/28/2021    K 3.5 10/28/2021    CL 11

## 2021-10-28 NOTE — PLAN OF CARE
Pt is A&Ox4. Patient up with 1 assist and rolling walker. Bilat TEDS/SCDs for VTE prophylaxis. Remote tele in place. Pt is voiding. Pt asked Dr to stay one more day to do PT and plan is to go home with no needs.  Bed in lowest position and call light with Continuous cardiac monitoring, monitor vital signs, obtain 12 lead EKG if indicated  - Evaluate effectiveness of antiarrhythmic and heart rate control medications as ordered  - Initiate emergency measures for life threatening arrhythmias  - Monitor electro transfers and ambulation using safe patient handling equipment as needed  - Ensure adequate protection for wounds/incisions during mobilization  - Obtain PT/OT consults as needed  - Advance activity as appropriate  - Communicate ordered activity level and safety including physical limitations  - Instruct pt to call for assistance with activity based on assessment  - Modify environment to reduce risk of injury  - Provide assistive devices as appropriate  - Consider OT/PT consult to assist with strengthening/

## 2021-10-28 NOTE — OPERATIVE REPORT
Operative Report    Patient Name:  Milan Collins  MR:  [id number]  :  1950  DOS:  10/27/21    Preop Dx:    Lumbar degenerative disc disease  Lumbar spondylolisthesis  Lumbar radiculopathy  Low back pain  Postop Dx:    Lumbar degenerative d turned over to Dr. Sima Arzola for the discectomy and implant placement. After fluoroscopic confirmation of the implant placement at L4-L5, the operative field was irrigated with saline. Adequate hemostasis was seen.   The retractors were slowly removed an

## 2021-10-28 NOTE — PLAN OF CARE
Patient up with 1 assist and rolling walker. Bilat TEDS/SCDs for VTE prophylaxis. Remote tele in place. Castillo intact with plan for removal in morning per ordered protocol. IVF running. Plan for home with no needs once cleared for discharge.     Problem Assess quality of pulses, skin color and temperature  - Assess for signs of decreased coronary artery perfusion - ex.  Angina  - Evaluate fluid balance, assess for edema, trend weights  Outcome: Progressing  Goal: Absence of cardiac arrhythmias or at baseli emptying  Outcome: Progressing     Problem: MUSCULOSKELETAL - ADULT  Goal: Return mobility to safest level of function  Description: INTERVENTIONS:  - Assess patient stability and activity tolerance for standing, transferring and ambulating w/ or w/o demarcus FALL  Goal: Free from fall injury  Description: INTERVENTIONS:  - Assess pt frequently for physical needs  - Identify cognitive and physical deficits and behaviors that affect risk of falls.   - [place] fall precautions as indicated by assessment.  - Educ

## 2021-10-28 NOTE — PROGRESS NOTES
[facility]    Progress Note    Holly Puentes Patient Status:  Inpatient    1950 MRN [id number]   Location [address] UNIT Attending Gold Whitney [address]  Day # 1 PCP Vesta Howard, 10/28/2021    .0 (L) 10/28/2021    CREATSERUM 0.50 (L) 10/28/2021    BUN 7 10/28/2021     10/28/2021    K 3.5 10/28/2021     10/28/2021    CO2 25.0 10/28/2021     (H) 10/28/2021    CA 8.4 (L) 10/28/2021       XR FLUOROSCOPY C-ARM

## 2021-10-28 NOTE — PHYSICAL THERAPY NOTE
PHYSICAL THERAPY TREATMENT NOTE - INPATIENT     Room Number: 407/407-A       Presenting Problem: L4-5 Anterior & Posterior fusion    Problem List  Principal Problem:    Spondylolisthesis of lumbar region  Active Problems:    Hyperlipidemia    S/P lumbar fu ASSESSMENT   Ratin          BALANCE                                                                                                                       Static Sitting: Fair -  Dynamic Sitting: Poor +           Static Standing: Poor +  Dynamic Standin Status Pt amb 2 x 75 ft  With RW and min a   Goal #4 Patient will negotiate 12 stairs/one curb w/ assistive device and supervision   Goal #4   Current Status NT   Goal #5 Patient to demonstrate independence with home activity/exercise instructions provided

## 2021-10-29 PROCEDURE — 99232 SBSQ HOSP IP/OBS MODERATE 35: CPT | Performed by: HOSPITALIST

## 2021-10-29 NOTE — PROGRESS NOTES
[facility]    Progress Note    Sultana You Patient Status:  Inpatient    1950 MRN [id number]   [facility] Attending Kayleen Porter[address] Day # 2 PCP Renea Randle, 29.2 (L) 10/29/2021    .0 10/29/2021    CREATSERUM 0.42 (L) 10/29/2021    BUN 6 (L) 10/29/2021     10/29/2021    K 3.3 (L) 10/29/2021     (H) 10/29/2021    CO2 23.0 10/29/2021     (H) 10/29/2021    CA 8.9 10/29/2021       No resul

## 2021-10-29 NOTE — PLAN OF CARE
Postop day 1 to 2. Up with 1 assist and rolling walker. Remote tele in place. Bilat SCDs/TEDS for VTE prophylaxis. Saline locked. 2L O2 NC placed in the evening d/t sats in the 80's. Encouraged IS use.   Per day RN, patient started on daily potassium f bleeding and/or hematoma  - Assess quality of pulses, skin color and temperature  - Assess for signs of decreased coronary artery perfusion - ex.  Angina  - Evaluate fluid balance, assess for edema, trend weights  Outcome: Progressing  Goal: Absence of card strategies to promote bladder emptying  Outcome: Progressing     Problem: MUSCULOSKELETAL - ADULT  Goal: Return mobility to safest level of function  Description: INTERVENTIONS:  - Assess patient stability and activity tolerance for standing, transferring Progressing     Problem: SAFETY ADULT - FALL  Goal: Free from fall injury  Description: INTERVENTIONS:  - Assess pt frequently for physical needs  - Identify cognitive and physical deficits and behaviors that affect risk of falls.   - [place] fall precaut

## 2021-10-29 NOTE — PHYSICAL THERAPY NOTE
PHYSICAL THERAPY TREATMENT NOTE - INPATIENT     Room Number: 407/407-A       Presenting Problem: L4-5 Anterior & Posterior fusion    Problem List  Principal Problem:    Spondylolisthesis of lumbar region  Active Problems:    Hyperlipidemia    S/P lumbar fu RESTRICTION  Weight Bearing Restriction: None                PAIN ASSESSMENT   Ratin          BALANCE                                                                                                                       Static Sitting: Fair -  Dynamic none at assistance level: independent   Goal #3   Current Status Pt amb 2 x 100 ft  With RW and CGA   Goal #4 Patient will negotiate 12 stairs/one curb w/ assistive device and supervision   Goal #4   Current Status Navigated 24 stairs with CGA   Goal #5 Pa

## 2021-10-29 NOTE — PLAN OF CARE
Pt is A&Ox4. Patient up with 1 assist and rolling walker. Bilat TEDS/SCDs for VTE prophylaxis. Pt is voiding. Pt needs to work with PT on stairs . plan is to go home with no needs. Bed in lowest position and call light within reach.   Problem: Patient Erica monitor vital signs, obtain 12 lead EKG if indicated  - Evaluate effectiveness of antiarrhythmic and heart rate control medications as ordered  - Initiate emergency measures for life threatening arrhythmias  - Monitor electrolytes and administer replacemen patient handling equipment as needed  - Ensure adequate protection for wounds/incisions during mobilization  - Obtain PT/OT consults as needed  - Advance activity as appropriate  - Communicate ordered activity level and limitations with patient/family  Out limitations  - Instruct pt to call for assistance with activity based on assessment  - Modify environment to reduce risk of injury  - Provide assistive devices as appropriate  - Consider OT/PT consult to assist with strengthening/mobility  - Encourage toil

## 2021-10-29 NOTE — CM/SW NOTE
SW received order for Advance Directives     SW spoke with pt and spouse at bedside.  They are currently not interested in filling out paperwork, will follow up with PCP if interested in the future    Pt is currently up with 1 and a walker, plan for DC home

## 2021-10-29 NOTE — PROGRESS NOTES
[facility]    Progress Note    Agustin  Patient Status:  Inpatient    1950 MRN [id number]   Location [facility] 4W/SW/SE Attending Lolis Donato [address] Day # 2 PCP DO Lopez Nelson weeks  Discharge instructions discussed  Discharge planning to home hopefully today            Results:     Lab Results   Component Value Date    WBC 8.9 10/28/2021    HGB 9.9 (L) 10/28/2021    HCT 30.6 (L) 10/28/2021    .0 (L) 10/28/2021    KEYUR

## 2021-10-30 VITALS
SYSTOLIC BLOOD PRESSURE: 153 MMHG | RESPIRATION RATE: 16 BRPM | WEIGHT: 122 LBS | OXYGEN SATURATION: 92 % | BODY MASS INDEX: 21.35 KG/M2 | HEIGHT: 63.5 IN | TEMPERATURE: 99 F | HEART RATE: 80 BPM | DIASTOLIC BLOOD PRESSURE: 75 MMHG

## 2021-10-30 PROCEDURE — 99233 SBSQ HOSP IP/OBS HIGH 50: CPT | Performed by: HOSPITALIST

## 2021-10-30 RX ORDER — POTASSIUM CHLORIDE 20 MEQ/1
40 TABLET, EXTENDED RELEASE ORAL EVERY 4 HOURS
Status: DISCONTINUED | OUTPATIENT
Start: 2021-10-30 | End: 2021-10-30

## 2021-10-30 NOTE — PLAN OF CARE
Problem: Patient Centered Care  Goal: Patient preferences are identified and integrated in the patient's plan of care  Description: Interventions:  - What would you like us to know as we care for you?   - Provide timely, complete, and accurate informatio administer replacement therapy as ordered  Outcome: Progressing     Problem: GASTROINTESTINAL - ADULT  Goal: Minimal or absence of nausea and vomiting  Description: INTERVENTIONS:  - Maintain adequate hydration with IV or PO as ordered and tolerated  - Rod patient/family  Outcome: Progressing  Goal: Maintain proper alignment of affected body part  Description: INTERVENTIONS:  - Support and protect limb and body alignment per provider's orders  - Instruct and reinforce with patient and family use of appropria Encourage toileting schedule  Outcome: Progressing     Problem: DISCHARGE PLANNING  Goal: Discharge to home or other facility with appropriate resources  Description: INTERVENTIONS:  - Identify barriers to discharge w/pt and caregiver  - Include patient/fa

## 2021-10-30 NOTE — DISCHARGE SUMMARY
[place] [facility]    Discharge Summary    Rosy Cesar Patient Status:  Inpatient    1950 MRN [id number]   Location [facility] 4W/SW/SE Attending Donald Celestin [address] Day # 3 PCP Cheyenne Kirk, DO     Date o She is alert and oriented to person, place, and time. No cranial nerve deficit, sensory deficit or motor deficit. Skin: Skin is warm and dry. She is not diaphoretic. Psychiatric: She has a normal mood and affect.  Her behavior is normal.   LUMBAR DRESSI ATARAX      Take 50 mg by mouth 3 (three) times daily as needed for Itching (Nausea). Refills: 0     Lovastatin 20 MG Tabs      Take 20 mg by mouth nightly. Refills: 0     MULTIVITAMIN TAB/CAP      Take 1 tablet by mouth every morning.    Refills: 0

## 2021-10-30 NOTE — PROGRESS NOTES
[place] [facility] - [facility]    Progress Note    Mookie Harris Patient Status:  Inpatient    1950 MRN [id number]   Location [facility] 4W/SW/SE Attending Sheryl Ritchie [address] Day # 3 PCP Prachi Reasons, DO        Brain Cooler Results   Component Value Date    WBC 8.7 10/30/2021    HGB 9.7 (L) 10/30/2021    HCT 29.7 (L) 10/30/2021    .0 10/30/2021    CREATSERUM 0.40 (L) 10/30/2021    BUN 7 10/30/2021     10/30/2021    K 3.6 10/30/2021     10/30/2021    CO2 23.

## 2021-10-30 NOTE — PHYSICAL THERAPY NOTE
PHYSICAL THERAPY TREATMENT NOTE - INPATIENT     Room Number: 407/407-A       Presenting Problem: L4-5 Anterior & Posterior fusion    Problem List  Principal Problem:    Spondylolisthesis of lumbar region  Active Problems:    Hyperlipidemia    S/P lumbar fu Static Sitting: Good  Dynamic Sitting: Good           Static Standing: Fair +  Dynamic Standing: Fair    ACTIVITY TOLERANCE                         O2 WALK       AM-PAC '6-Clicks' INPATIENT SHORT FORM - BASIC MOBILITY  How mu demonstrate independence with home activity/exercise instructions provided to patient in preparation for discharge.    Goal #5   Current Status In progress   Goal #6    Goal #6  Current Status

## 2021-10-30 NOTE — CM/SW NOTE
10/30/21 1100   Discharge disposition   Expected discharge disposition Home or Self   Discharge transportation Private car     Noted discharge order. Per SW note, pt has no discharge needs.   Noted PT [place] for [id number] who confirms pt is s

## 2021-11-01 NOTE — PAYOR COMM NOTE
--------------  DISCHARGE REVIEW    Payor: [place] Mathur  Subscriber #:  [id number]  Authorization Number: [id number]    Admit date: 10/27/21  Admit time:   5:51 AM  Discharge Date: 10/30/2021  2:42 PM     Admitting Physician: Margie Castro DO discharge. No scleral icterus. Neck: Neck supple. No tracheal deviation present. No thyromegaly present. Cardiovascular: Normal rate, regular rhythm, normal heart sounds and intact distal pulses. No murmur heard. Edema not present. Carotid bruit no morphogenic protein  Use of operative microscope. PAIN CONTROLLED WITH NORCO  TOLERATING DIET  TOLERATED PT/OT, STABLE FOR DC THIS AFTERNOON. Hyperlipidemia  CONT HOME MEDS.      Consultations: ORTHO, GEN SURG    Procedures: SEE ABOVE    Complications

## 2021-11-01 NOTE — PAYOR COMM NOTE
--------------  CONTINUED STAY REVIEW    Payor: [place]  Subscriber #:  [id number]  Authorization Number: [id number]    Admit date: 10/27/21  Admit time:  5:51 AM    Admitting Physician: Oleg Marin DO  Attending Physician:  No att.  pr lumbar interbody fusion via supine, mini-open retroperitoneal approach (ALIF)  Insertion of interbody biomechanical device/cage L4-5  Use of morselized allograft and bone morphogenic protein  Use of free running EMG/SSEP monitoring  Use of biplanar fluoros education;Gait training     Hospitalist    Blood pressure 101/61, pulse 72, temperature 98.6 °F (37 °C), resp.  rate 16, SpO2 90 %   Component Value Date     WBC 9.3 10/29/2021     HGB 9.3 (L) 10/29/2021     HCT 29.2 (L) 10/29/2021     .0 10/29/2021 OR  Start: 10/27/21 1345 End: 10/30/21 1642   Admin Instructions:   Do not crush        1548-Given   2208-Given          0751-Given   2005-Given          1013-Given   2127-Given          0742-Given   1642-D/C'd      multivitamin (ADULT) tab 1 tablet  Dose: 0620-New Bag   0741-Paused [C]     0742-Restarted   0935-Anesthesia Volume Adjustment     1127-New Bag   (1900)-Not Given          0501-D/C'd               Or  HYDROcodone-acetaminophen (NORCO)  MG per tab 1 tablet  Dose: 1 tablet  Freq: Every 4 ho reason as a guide and follow range order policy.  If oral pain meds are ordered and patient can tolerate oral intake, start with PRN oral pain medications first.        1548-Given               1642-D/C'd       Or  HYDROmorphone HCl (DILAUDID) 1 MG/ML injec (Room air) —        FOR REVIEW/APPROVAL OF CONTINUED INPT ADMISSION

## 2021-12-06 ENCOUNTER — LAB ENCOUNTER (OUTPATIENT)
Dept: LAB | Age: 71
DRG: 472 | End: 2021-12-06
Attending: ORTHOPAEDIC SURGERY
Payer: MEDICARE

## 2021-12-06 DIAGNOSIS — Z01.818 PREOP TESTING: ICD-10-CM

## 2021-12-08 ENCOUNTER — HOSPITAL ENCOUNTER (INPATIENT)
Facility: HOSPITAL | Age: 71
LOS: 1 days | Discharge: HOME | DRG: 472 | End: 2021-12-09
Attending: ORTHOPAEDIC SURGERY | Admitting: ORTHOPAEDIC SURGERY
Payer: MEDICARE

## 2021-12-08 ENCOUNTER — ANESTHESIA EVENT (OUTPATIENT)
Dept: SURGERY | Facility: HOSPITAL | Age: 71
DRG: 472 | End: 2021-12-08
Payer: MEDICARE

## 2021-12-08 ENCOUNTER — ANESTHESIA (OUTPATIENT)
Dept: SURGERY | Facility: HOSPITAL | Age: 71
DRG: 472 | End: 2021-12-08
Payer: MEDICARE

## 2021-12-08 ENCOUNTER — APPOINTMENT (OUTPATIENT)
Dept: GENERAL RADIOLOGY | Facility: HOSPITAL | Age: 71
DRG: 472 | End: 2021-12-08
Attending: ORTHOPAEDIC SURGERY
Payer: MEDICARE

## 2021-12-08 DIAGNOSIS — Z01.818 PREOP TESTING: Primary | ICD-10-CM

## 2021-12-08 PROBLEM — Z98.1 S/P CERVICAL SPINAL FUSION: Status: ACTIVE | Noted: 2021-12-08

## 2021-12-08 PROBLEM — M48.02 CERVICAL SPINAL STENOSIS: Status: ACTIVE | Noted: 2021-12-08

## 2021-12-08 PROCEDURE — 0RG10A0 FUSION OF CERVICAL VERTEBRAL JOINT WITH INTERBODY FUSION DEVICE, ANTERIOR APPROACH, ANTERIOR COLUMN, OPEN APPROACH: ICD-10-PCS | Performed by: ORTHOPAEDIC SURGERY

## 2021-12-08 PROCEDURE — BR101ZZ FLUOROSCOPY OF CERVICAL SPINE USING LOW OSMOLAR CONTRAST: ICD-10-PCS | Performed by: ORTHOPAEDIC SURGERY

## 2021-12-08 PROCEDURE — 0RT50ZZ RESECTION OF CERVICOTHORACIC VERTEBRAL DISC, OPEN APPROACH: ICD-10-PCS | Performed by: ORTHOPAEDIC SURGERY

## 2021-12-08 PROCEDURE — 0RG40A0 FUSION OF CERVICOTHORACIC VERTEBRAL JOINT WITH INTERBODY FUSION DEVICE, ANTERIOR APPROACH, ANTERIOR COLUMN, OPEN APPROACH: ICD-10-PCS | Performed by: ORTHOPAEDIC SURGERY

## 2021-12-08 PROCEDURE — 4A11X4G MONITORING OF PERIPHERAL NERVOUS ELECTRICAL ACTIVITY, INTRAOPERATIVE, EXTERNAL APPROACH: ICD-10-PCS | Performed by: ORTHOPAEDIC SURGERY

## 2021-12-08 PROCEDURE — 76000 FLUOROSCOPY <1 HR PHYS/QHP: CPT | Performed by: ORTHOPAEDIC SURGERY

## 2021-12-08 PROCEDURE — 99232 SBSQ HOSP IP/OBS MODERATE 35: CPT | Performed by: HOSPITALIST

## 2021-12-08 PROCEDURE — 01N10ZZ RELEASE CERVICAL NERVE, OPEN APPROACH: ICD-10-PCS | Performed by: ORTHOPAEDIC SURGERY

## 2021-12-08 PROCEDURE — BR171ZZ FLUOROSCOPY OF THORACIC SPINE USING LOW OSMOLAR CONTRAST: ICD-10-PCS | Performed by: ORTHOPAEDIC SURGERY

## 2021-12-08 PROCEDURE — 01N80ZZ RELEASE THORACIC NERVE, OPEN APPROACH: ICD-10-PCS | Performed by: ORTHOPAEDIC SURGERY

## 2021-12-08 DEVICE — PROPEL PUTTY SMALL: Type: IMPLANTABLE DEVICE | Site: SPINE CERVICAL | Status: FUNCTIONAL

## 2021-12-08 RX ORDER — ONDANSETRON 2 MG/ML
INJECTION INTRAMUSCULAR; INTRAVENOUS AS NEEDED
Status: DISCONTINUED | OUTPATIENT
Start: 2021-12-08 | End: 2021-12-08 | Stop reason: SURG

## 2021-12-08 RX ORDER — DIPHENHYDRAMINE HCL 25 MG
25 CAPSULE ORAL EVERY 4 HOURS PRN
Status: DISCONTINUED | OUTPATIENT
Start: 2021-12-08 | End: 2021-12-09

## 2021-12-08 RX ORDER — HYDROCODONE BITARTRATE AND ACETAMINOPHEN 5; 325 MG/1; MG/1
1 TABLET ORAL AS NEEDED
Status: DISCONTINUED | OUTPATIENT
Start: 2021-12-08 | End: 2021-12-08 | Stop reason: HOSPADM

## 2021-12-08 RX ORDER — SODIUM CHLORIDE, SODIUM LACTATE, POTASSIUM CHLORIDE, CALCIUM CHLORIDE 600; 310; 30; 20 MG/100ML; MG/100ML; MG/100ML; MG/100ML
INJECTION, SOLUTION INTRAVENOUS CONTINUOUS
Status: DISCONTINUED | OUTPATIENT
Start: 2021-12-08 | End: 2021-12-08 | Stop reason: HOSPADM

## 2021-12-08 RX ORDER — HYDROCODONE BITARTRATE AND ACETAMINOPHEN 5; 325 MG/1; MG/1
2 TABLET ORAL AS NEEDED
Status: DISCONTINUED | OUTPATIENT
Start: 2021-12-08 | End: 2021-12-08 | Stop reason: HOSPADM

## 2021-12-08 RX ORDER — HYDROMORPHONE HYDROCHLORIDE 1 MG/ML
0.2 INJECTION, SOLUTION INTRAMUSCULAR; INTRAVENOUS; SUBCUTANEOUS EVERY 5 MIN PRN
Status: DISCONTINUED | OUTPATIENT
Start: 2021-12-08 | End: 2021-12-08 | Stop reason: HOSPADM

## 2021-12-08 RX ORDER — NALOXONE HYDROCHLORIDE 0.4 MG/ML
80 INJECTION, SOLUTION INTRAMUSCULAR; INTRAVENOUS; SUBCUTANEOUS AS NEEDED
Status: DISCONTINUED | OUTPATIENT
Start: 2021-12-08 | End: 2021-12-08 | Stop reason: HOSPADM

## 2021-12-08 RX ORDER — ONDANSETRON 2 MG/ML
4 INJECTION INTRAMUSCULAR; INTRAVENOUS EVERY 4 HOURS PRN
Status: DISCONTINUED | OUTPATIENT
Start: 2021-12-08 | End: 2021-12-09

## 2021-12-08 RX ORDER — PRAVASTATIN SODIUM 20 MG
20 TABLET ORAL NIGHTLY
Status: DISCONTINUED | OUTPATIENT
Start: 2021-12-08 | End: 2021-12-09

## 2021-12-08 RX ORDER — SODIUM CHLORIDE, SODIUM LACTATE, POTASSIUM CHLORIDE, CALCIUM CHLORIDE 600; 310; 30; 20 MG/100ML; MG/100ML; MG/100ML; MG/100ML
INJECTION, SOLUTION INTRAVENOUS CONTINUOUS
Status: DISCONTINUED | OUTPATIENT
Start: 2021-12-08 | End: 2021-12-08

## 2021-12-08 RX ORDER — CYCLOBENZAPRINE HCL 5 MG
5 TABLET ORAL 3 TIMES DAILY PRN
Status: DISCONTINUED | OUTPATIENT
Start: 2021-12-08 | End: 2021-12-09

## 2021-12-08 RX ORDER — MORPHINE SULFATE 4 MG/ML
2 INJECTION, SOLUTION INTRAMUSCULAR; INTRAVENOUS EVERY 10 MIN PRN
Status: DISCONTINUED | OUTPATIENT
Start: 2021-12-08 | End: 2021-12-08 | Stop reason: HOSPADM

## 2021-12-08 RX ORDER — DIPHENHYDRAMINE HYDROCHLORIDE 50 MG/ML
25 INJECTION INTRAMUSCULAR; INTRAVENOUS EVERY 4 HOURS PRN
Status: DISCONTINUED | OUTPATIENT
Start: 2021-12-08 | End: 2021-12-09

## 2021-12-08 RX ORDER — BISACODYL 10 MG
10 SUPPOSITORY, RECTAL RECTAL
Status: DISCONTINUED | OUTPATIENT
Start: 2021-12-08 | End: 2021-12-09

## 2021-12-08 RX ORDER — EPHEDRINE SULFATE 50 MG/ML
INJECTION, SOLUTION INTRAVENOUS AS NEEDED
Status: DISCONTINUED | OUTPATIENT
Start: 2021-12-08 | End: 2021-12-08 | Stop reason: SURG

## 2021-12-08 RX ORDER — POLYETHYLENE GLYCOL 3350 17 G/17G
17 POWDER, FOR SOLUTION ORAL DAILY PRN
Status: DISCONTINUED | OUTPATIENT
Start: 2021-12-08 | End: 2021-12-09

## 2021-12-08 RX ORDER — HYDROCODONE BITARTRATE AND ACETAMINOPHEN 10; 325 MG/1; MG/1
1 TABLET ORAL EVERY 4 HOURS PRN
Status: DISCONTINUED | OUTPATIENT
Start: 2021-12-08 | End: 2021-12-09

## 2021-12-08 RX ORDER — PROCHLORPERAZINE EDISYLATE 5 MG/ML
10 INJECTION INTRAMUSCULAR; INTRAVENOUS EVERY 6 HOURS PRN
Status: DISCONTINUED | OUTPATIENT
Start: 2021-12-08 | End: 2021-12-09

## 2021-12-08 RX ORDER — ACETAMINOPHEN 325 MG/1
650 TABLET ORAL EVERY 4 HOURS PRN
Status: DISCONTINUED | OUTPATIENT
Start: 2021-12-08 | End: 2021-12-09

## 2021-12-08 RX ORDER — DEXAMETHASONE SODIUM PHOSPHATE 4 MG/ML
VIAL (ML) INJECTION AS NEEDED
Status: DISCONTINUED | OUTPATIENT
Start: 2021-12-08 | End: 2021-12-08 | Stop reason: SURG

## 2021-12-08 RX ORDER — SENNOSIDES 8.6 MG
17.2 TABLET ORAL NIGHTLY PRN
Status: DISCONTINUED | OUTPATIENT
Start: 2021-12-08 | End: 2021-12-09

## 2021-12-08 RX ORDER — HYDROMORPHONE HYDROCHLORIDE 1 MG/ML
0.2 INJECTION, SOLUTION INTRAMUSCULAR; INTRAVENOUS; SUBCUTANEOUS EVERY 2 HOUR PRN
Status: DISCONTINUED | OUTPATIENT
Start: 2021-12-08 | End: 2021-12-09

## 2021-12-08 RX ORDER — PROCHLORPERAZINE EDISYLATE 5 MG/ML
5 INJECTION INTRAMUSCULAR; INTRAVENOUS ONCE AS NEEDED
Status: DISCONTINUED | OUTPATIENT
Start: 2021-12-08 | End: 2021-12-08 | Stop reason: HOSPADM

## 2021-12-08 RX ORDER — SODIUM PHOSPHATE, DIBASIC AND SODIUM PHOSPHATE, MONOBASIC 7; 19 G/133ML; G/133ML
1 ENEMA RECTAL ONCE AS NEEDED
Status: DISCONTINUED | OUTPATIENT
Start: 2021-12-08 | End: 2021-12-09

## 2021-12-08 RX ORDER — MORPHINE SULFATE 10 MG/ML
6 INJECTION, SOLUTION INTRAMUSCULAR; INTRAVENOUS EVERY 10 MIN PRN
Status: DISCONTINUED | OUTPATIENT
Start: 2021-12-08 | End: 2021-12-08 | Stop reason: HOSPADM

## 2021-12-08 RX ORDER — PREGABALIN 75 MG/1
75 CAPSULE ORAL 2 TIMES DAILY
Status: DISCONTINUED | OUTPATIENT
Start: 2021-12-08 | End: 2021-12-09

## 2021-12-08 RX ORDER — LIDOCAINE HYDROCHLORIDE 10 MG/ML
INJECTION, SOLUTION EPIDURAL; INFILTRATION; INTRACAUDAL; PERINEURAL AS NEEDED
Status: DISCONTINUED | OUTPATIENT
Start: 2021-12-08 | End: 2021-12-08 | Stop reason: SURG

## 2021-12-08 RX ORDER — ONDANSETRON 2 MG/ML
4 INJECTION INTRAMUSCULAR; INTRAVENOUS ONCE AS NEEDED
Status: DISCONTINUED | OUTPATIENT
Start: 2021-12-08 | End: 2021-12-08 | Stop reason: HOSPADM

## 2021-12-08 RX ORDER — HYDROMORPHONE HYDROCHLORIDE 1 MG/ML
0.4 INJECTION, SOLUTION INTRAMUSCULAR; INTRAVENOUS; SUBCUTANEOUS EVERY 5 MIN PRN
Status: DISCONTINUED | OUTPATIENT
Start: 2021-12-08 | End: 2021-12-08 | Stop reason: HOSPADM

## 2021-12-08 RX ORDER — HYDROMORPHONE HYDROCHLORIDE 1 MG/ML
0.6 INJECTION, SOLUTION INTRAMUSCULAR; INTRAVENOUS; SUBCUTANEOUS EVERY 5 MIN PRN
Status: DISCONTINUED | OUTPATIENT
Start: 2021-12-08 | End: 2021-12-08 | Stop reason: HOSPADM

## 2021-12-08 RX ORDER — HALOPERIDOL 5 MG/ML
0.25 INJECTION INTRAMUSCULAR ONCE AS NEEDED
Status: DISCONTINUED | OUTPATIENT
Start: 2021-12-08 | End: 2021-12-08 | Stop reason: HOSPADM

## 2021-12-08 RX ORDER — HYDROMORPHONE HYDROCHLORIDE 1 MG/ML
0.4 INJECTION, SOLUTION INTRAMUSCULAR; INTRAVENOUS; SUBCUTANEOUS EVERY 2 HOUR PRN
Status: DISCONTINUED | OUTPATIENT
Start: 2021-12-08 | End: 2021-12-09

## 2021-12-08 RX ORDER — DOXEPIN HYDROCHLORIDE 50 MG/1
1 CAPSULE ORAL EVERY MORNING
Status: DISCONTINUED | OUTPATIENT
Start: 2021-12-09 | End: 2021-12-09

## 2021-12-08 RX ORDER — ROCURONIUM BROMIDE 10 MG/ML
INJECTION, SOLUTION INTRAVENOUS AS NEEDED
Status: DISCONTINUED | OUTPATIENT
Start: 2021-12-08 | End: 2021-12-08 | Stop reason: SURG

## 2021-12-08 RX ORDER — SODIUM CHLORIDE 9 MG/ML
INJECTION, SOLUTION INTRAVENOUS CONTINUOUS
Status: DISCONTINUED | OUTPATIENT
Start: 2021-12-08 | End: 2021-12-09

## 2021-12-08 RX ORDER — HYDROMORPHONE HYDROCHLORIDE 1 MG/ML
0.8 INJECTION, SOLUTION INTRAMUSCULAR; INTRAVENOUS; SUBCUTANEOUS EVERY 2 HOUR PRN
Status: DISCONTINUED | OUTPATIENT
Start: 2021-12-08 | End: 2021-12-09

## 2021-12-08 RX ORDER — CEFAZOLIN SODIUM/WATER 2 G/20 ML
2 SYRINGE (ML) INTRAVENOUS ONCE
Status: COMPLETED | OUTPATIENT
Start: 2021-12-08 | End: 2021-12-08

## 2021-12-08 RX ORDER — ACETAMINOPHEN 500 MG
1000 TABLET ORAL ONCE
Status: COMPLETED | OUTPATIENT
Start: 2021-12-08 | End: 2021-12-08

## 2021-12-08 RX ORDER — CEFAZOLIN SODIUM/WATER 2 G/20 ML
2 SYRINGE (ML) INTRAVENOUS EVERY 8 HOURS
Status: COMPLETED | OUTPATIENT
Start: 2021-12-08 | End: 2021-12-09

## 2021-12-08 RX ORDER — MORPHINE SULFATE 4 MG/ML
4 INJECTION, SOLUTION INTRAMUSCULAR; INTRAVENOUS EVERY 10 MIN PRN
Status: DISCONTINUED | OUTPATIENT
Start: 2021-12-08 | End: 2021-12-08 | Stop reason: HOSPADM

## 2021-12-08 RX ORDER — HYDROCODONE BITARTRATE AND ACETAMINOPHEN 10; 325 MG/1; MG/1
2 TABLET ORAL EVERY 4 HOURS PRN
Status: DISCONTINUED | OUTPATIENT
Start: 2021-12-08 | End: 2021-12-09

## 2021-12-08 RX ORDER — SERTRALINE HYDROCHLORIDE 100 MG/1
100 TABLET, FILM COATED ORAL EVERY MORNING
Status: DISCONTINUED | OUTPATIENT
Start: 2021-12-09 | End: 2021-12-09

## 2021-12-08 RX ADMIN — EPHEDRINE SULFATE 5 MG: 50 INJECTION, SOLUTION INTRAVENOUS at 11:36:00

## 2021-12-08 RX ADMIN — SODIUM CHLORIDE, SODIUM LACTATE, POTASSIUM CHLORIDE, CALCIUM CHLORIDE: 600; 310; 30; 20 INJECTION, SOLUTION INTRAVENOUS at 14:23:00

## 2021-12-08 RX ADMIN — SODIUM CHLORIDE, SODIUM LACTATE, POTASSIUM CHLORIDE, CALCIUM CHLORIDE: 600; 310; 30; 20 INJECTION, SOLUTION INTRAVENOUS at 14:55:00

## 2021-12-08 RX ADMIN — ONDANSETRON 4 MG: 2 INJECTION INTRAMUSCULAR; INTRAVENOUS at 14:27:00

## 2021-12-08 RX ADMIN — LIDOCAINE HYDROCHLORIDE 50 MG: 10 INJECTION, SOLUTION EPIDURAL; INFILTRATION; INTRACAUDAL; PERINEURAL at 10:53:00

## 2021-12-08 RX ADMIN — EPHEDRINE SULFATE 5 MG: 50 INJECTION, SOLUTION INTRAVENOUS at 13:14:00

## 2021-12-08 RX ADMIN — DEXAMETHASONE SODIUM PHOSPHATE 4 MG: 4 MG/ML VIAL (ML) INJECTION at 12:25:00

## 2021-12-08 RX ADMIN — CEFAZOLIN SODIUM/WATER 2 G: 2 G/20 ML SYRINGE (ML) INTRAVENOUS at 11:02:00

## 2021-12-08 RX ADMIN — ROCURONIUM BROMIDE 10 MG: 10 INJECTION, SOLUTION INTRAVENOUS at 10:53:00

## 2021-12-08 RX ADMIN — EPHEDRINE SULFATE 10 MG: 50 INJECTION, SOLUTION INTRAVENOUS at 11:01:00

## 2021-12-08 RX ADMIN — EPHEDRINE SULFATE 5 MG: 50 INJECTION, SOLUTION INTRAVENOUS at 13:17:00

## 2021-12-08 NOTE — ANESTHESIA POSTPROCEDURE EVALUATION
Patient: Juan Lezama    Procedure Summary     Date: 12/08/21 Room / Location: [facility] MAIN OR 01 / [address] [facility] MAIN OR    Anesthesia Start: 2766 Anesthesia Stop: 5873    Procedure: C6-7 and C7-T1 anterior cervical decompression and fusion and use of allogra

## 2021-12-08 NOTE — BRIEF OP NOTE
Pre-Operative Diagnosis: cervical radiculopathy, radiculopathy cervical region     Post-Operative Diagnosis: cervical radiculopathy, radiculopathy cervical region      Procedure Performed:   C6-7 and C7-T1 anterior cervical decompression and fusion and use

## 2021-12-08 NOTE — ANESTHESIA PREPROCEDURE EVALUATION
Anesthesia PreOp Note    HPI:     Vik Curtis is a 66year old female who presents for preoperative consultation requested by: Oleg Marin DO    Date of Surgery: 12/8/2021    Procedure(s):  C6-7 and C7-T1 with possible C3-4, C4-5, morning., Disp: , Rfl: , 12/8/2021 at 0830  SUMAtriptan Succinate 100 MG Oral Tab, Take 100 mg by mouth every 2 (two) hours as needed for Migraine. , Disp: , Rfl: , Past Month at Unknown time  traMADol 50 MG Oral Tab, Take 50 mg by mouth every 6 (six) hours Connections: Not on file  Intimate Partner Violence: Not on file  Housing Stability: Not on file    Available pre-op labs reviewed.   Lab Results   Component Value Date    WBC 8.7 10/30/2021    RBC 3.29 (L) 10/30/2021    HGB 9.7 (L) 10/30/2021    HCT 29.7 ( Chelly Glover of the nature of the anesthetic plan, benefits, risks including possible dental damage if relevant, major complications, and any alternative forms of anesthetic management. All of the patient's questions were answered to the best of my ability.

## 2021-12-08 NOTE — ANESTHESIA PROCEDURE NOTES
Airway  Date/Time: 12/8/2021 10:56 AM  Urgency: Elective      General Information and Staff    Patient location during procedure: OR  Anesthesiologist: Nichole Camp MD  Resident/CRNA: Felisa Fregoso CRNA  Performed: anesthesiologist and CRNA

## 2021-12-08 NOTE — PROGRESS NOTES
Spine Surgery Post Op Note  Patient seen in PACU  Awake, still a little drowsy  Has no new complaints  Dressing clean and dry  No neck swelling  Has hard aspen cervical collar   Motor and sensation grossly intact bilateral upper and lower extremities, stil

## 2021-12-08 NOTE — PROGRESS NOTES
Spine Progress Note  Again had a discussion with the patient and her . She would prefer to just do the bottom 2 levels, C6-7 and C7-T1. She understands the risks of persistent central cervical stenosis and potential need for additional surgery.

## 2021-12-08 NOTE — OPERATIVE REPORT
Date of Surgery  12/08/21    Preoperative Diagnosis  Cervical degenerative disc disease C6-7 and C7-T1  Cervical stenosis with radiculopathy C6-7 and C7-T1  Cervical spondylolisthesis C3-4 and C4-5    Cervical stenosis C3-4, C4-5, C5-6 with myelopathy  Marda Herd changes noted. We again had a lengthy discussion regarding options for care and treatment moving forward. Given the progression of her hand symptoms we discussed surgical options. We again talked about addressing all levels of stenosis C3-4 thru C7-T1.  She cages, screws/anchors and possibly plate and screws that will be implanted in the spine for life. The patient also understood that there is a risk for adjacent level breakdown in the future which may require additional surgery.  The patient has a good under with her neck slightly extended. KENDELL hose and SCDs were placed in the preoperative holding area and the SCD machine was reconnected.  Foam pads were placed beneath the heels, pillows were placed beneath the knees, and the arms were well padded and tucked to below the targeted disc and mild distraction was applied. The anterior vertebral body bone was notably soft. The disc was removed using combination of straight curettes and pituitary and Kerrison rongeurs.  Smooth trials sized 5, 6, and 7 were used sequenti and we used the previously placed pin below the disc space and mild distraction was applied. The disc was removed using combination of straight curettes and pituitary and Kerrison rongeurs.  Smooth trials sized 5, 6, and 7 were used sequentially to get adeq 3-0 Vicryl suture. The subcutaneous tissue was closed with 3-0 Vicryl interrupted sutures. The skin was closed with 4-0 monocryl running subcuticular suture.  Skin glue and a sterile dressing was applied. A hard cervical collar was placed. All counts were c

## 2021-12-08 NOTE — INTERVAL H&P NOTE
Pre-op Diagnosis: cervical radiculopathy, radiculopathy cervical region    The above referenced H&P was reviewed by Nhi Barnes DO on 12/8/2021, the patient was examined and no significant changes have occurred in the patient's condition since

## 2021-12-08 NOTE — PROGRESS NOTES
[facility]OSP - [facility] [place]    Progress Note    Fabiano Sashaon Patient Status:  Inpatient    1950 MRN [id number]   Location [facility] UNIT Attending Jeet Mock [address] Day # 0 PCP Elner Brunner, CREATSERUM 0.40 (L) 10/30/2021    BUN 7 10/30/2021     10/30/2021    K 3.6 10/30/2021     10/30/2021    CO2 23.0 10/30/2021    GLU 96 10/30/2021    CA 8.7 10/30/2021       No results found.         Assessment & Plan:     Cervical spinal stenosis

## 2021-12-09 VITALS
DIASTOLIC BLOOD PRESSURE: 70 MMHG | TEMPERATURE: 98 F | OXYGEN SATURATION: 97 % | HEART RATE: 69 BPM | BODY MASS INDEX: 21.52 KG/M2 | SYSTOLIC BLOOD PRESSURE: 118 MMHG | WEIGHT: 123 LBS | HEIGHT: 63.5 IN | RESPIRATION RATE: 16 BRPM

## 2021-12-09 PROCEDURE — 99239 HOSP IP/OBS DSCHRG MGMT >30: CPT | Performed by: HOSPITALIST

## 2021-12-09 NOTE — OCCUPATIONAL THERAPY NOTE
OCCUPATIONAL THERAPY EVALUATION - INPATIENT     Room Number: 427/427-A  Evaluation Date: 12/9/2021  Type of Evaluation: Initial  Presenting Problem:  (C6-7C7-T1 acdf)    Physician Order: IP Consult to Occupational Therapy  Reason for Therapy: ADL/IADL Dy Supervision  OT Device Recommendations: Shower chair    PLAN    Patient has been evaluated and presents with no skilled Occupational Therapy needs  at this time. Patient will be discharged from Occupational Therapy services.  Please re-order if a new funct Pt denied use of ad    SUBJECTIVE  \"My throat is a little sore\"    OCCUPATIONAL THERAPY EXAMINATION      OBJECTIVE  Precautions: Spine (Cervical Aspen collar)  Fall Risk: Standard fall risk    PAIN ASSESSMENT  Rating:  (pt did not rate pain)  Location: Dressing: mod a    Patient End of Session: Up in chair;Needs met;Call light within reach;RN aware of session/findings; All patient questions and concerns addressed  Symptoms

## 2021-12-09 NOTE — PLAN OF CARE
Pt is A&Ox4. SCDs and teds for DVT prophylaxis. Last BM was 12/7. Castillo removed. Pt voided. NS @100. General diet. Aspen collar in place, remove for meals and showers. 4x4s, tegaderm and dermabond. Keep HOB greater than 30 degrees.  Baseline L hand cannot non-pharmacological measures as appropriate and evaluate response  - Consider cultural and social influences on pain and pain management  - Manage/alleviate anxiety  - Utilize distraction and/or relaxation techniques  - Monitor for opioid side effects  - N Refer to Case Management Department for coordinating discharge planning if the patient needs post-hospital services based on physician/LIP order or complex needs related to functional status, cognitive ability or social support system  Outcome: Progressing

## 2021-12-09 NOTE — DISCHARGE SUMMARY
Hospital Discharge Diagnoses: Cervical Spinal stenosis    Lace+ Score: 25  59-90 High Risk  29-58 Medium Risk  0-28   Low Risk.     TCM Follow-Up Recommendation:  LACE < 29: Low Risk of readmission after discharge from the hospital; Still recommend for TCM

## 2021-12-09 NOTE — PROGRESS NOTES
[facility]    Progress Note    Skylar Ridley Patient Status:  Inpatient    1950 MRN [id number]   Location [facility] 4W/SW/SE Attending German Swanson[address] Day # 1 PCP DO Raúl Johnson weeks as outpatient  May remove dressing and shower Saturday 12/11/21  Do not soak incision  Rx on chart - norco, flexeril and colace  Discharge instructions discussed   All questions answered          Results:     Lab Results   Component Value Date    WBC

## 2021-12-09 NOTE — PHYSICAL THERAPY NOTE
PHYSICAL THERAPY EVALUATION - INPATIENT     Room Number: 427/427-A  Evaluation Date: 12/9/2021  Type of Evaluation: Initial   Physician Order: PT Eval and Treat    Presenting Problem: Cervical spinal stenosis C6-C7 ant cervical fusion     Reason for [facility] mobility; Endurance; Energy conservation;Patient education; Family education;Gait training;Strengthening;Stair training;Stoop training;Transfer training;Balance training  Rehab Potential : Good  Frequency (Obs): Daily       PHYSICAL THERAPY MEDICAL/SOCIAL HIS 2 hands on the side rail like we practiced.      PHYSICAL THERAPY EXAMINATION     OBJECTIVE  Precautions: Spine (Cervical Aspen collar)       WEIGHT BEARING RESTRICTION                PAIN ASSESSMENT  Rating: 3  Location: ant cervical soreness with movement Descend  Ascend and Descend : Step to    Bed Mobility: SBA log roll    Transfers: SBA     Exercise/Education Provided:  Bed mobility  Body mechanics  Energy conservation  Functional activity tolerated  Gait training  Posture  ROM  Strengthening  Lower ther

## 2021-12-09 NOTE — PLAN OF CARE
Pt is A&Ox4. RA. SCDs and teds for DVT prophylaxis. Last BM was 12/7. Castillo in place. NS @100. General diet. Aspen collar in place, remove for meals and showers. 4x4s, tegaderm and dermabond. Keep HOB greater than 30 degrees. L hand cannot open fully.  Thelma Orantes Monitor for opioid side effects  - Notify MD/LIP if interventions unsuccessful or patient reports new pain  - Anticipate increased pain with activity and pre-medicate as appropriate  Outcome: Progressing     Problem: RISK FOR INFECTION - ADULT  Goal: Absen support system  Outcome: Progressing

## 2021-12-10 NOTE — DISCHARGE SUMMARY
[facility]    PATIENT'S NAME: Otoniel Dumont   ATTENDING PHYSICIAN: Oleg Soto MD   PATIENT ACCOUNT#:   [id number]    LOCATION:  [address] [place] [address] #:   [id number]       YOB: 1950  ADMISSION DATE: collar and anterior dressing. CDI. CARDIAC:  S1, S2 appreciated. LUNGS:  Good air entry bilaterally. ABDOMEN:  Soft, nontender, nondistended. Positive bowel sounds. EXTREMITIES:  Peripheral pulses are positive.   NEUROLOGIC:  No focal neurologic defic

## 2022-03-24 ENCOUNTER — OFFICE VISIT (OUTPATIENT)
Dept: PHYSICAL MEDICINE AND REHAB | Facility: CLINIC | Age: 72
End: 2022-03-24
Payer: MEDICARE

## 2022-03-24 VITALS — WEIGHT: 132 LBS | HEIGHT: 63.5 IN | BODY MASS INDEX: 23.1 KG/M2

## 2022-03-24 DIAGNOSIS — Z98.1 S/P CERVICAL SPINAL FUSION: ICD-10-CM

## 2022-03-24 DIAGNOSIS — R29.898 HAND WEAKNESS: Primary | ICD-10-CM

## 2022-03-24 DIAGNOSIS — M79.7 FIBROMYALGIA: ICD-10-CM

## 2022-03-24 DIAGNOSIS — F32.A DEPRESSION, UNSPECIFIED DEPRESSION TYPE: ICD-10-CM

## 2022-03-24 DIAGNOSIS — M81.0 AGE-RELATED OSTEOPOROSIS WITHOUT CURRENT PATHOLOGICAL FRACTURE: ICD-10-CM

## 2022-03-24 PROCEDURE — 3008F BODY MASS INDEX DOCD: CPT | Performed by: PHYSICAL MEDICINE & REHABILITATION

## 2022-03-24 PROCEDURE — 99203 OFFICE O/P NEW LOW 30 MIN: CPT | Performed by: PHYSICAL MEDICINE & REHABILITATION

## 2022-03-24 RX ORDER — SULFAMETHOXAZOLE AND TRIMETHOPRIM 800; 160 MG/1; MG/1
TABLET ORAL
COMMUNITY
Start: 2022-02-09

## 2022-03-24 RX ORDER — NITROFURANTOIN 25; 75 MG/1; MG/1
CAPSULE ORAL EVERY 12 HOURS
COMMUNITY

## 2022-03-24 RX ORDER — CONJUGATED ESTROGENS 0.62 MG/G
CREAM VAGINAL
COMMUNITY

## 2022-03-25 PROBLEM — R29.898 HAND WEAKNESS: Status: ACTIVE | Noted: 2022-03-25

## 2022-04-21 ENCOUNTER — PROCEDURE VISIT (OUTPATIENT)
Dept: PHYSICAL MEDICINE AND REHAB | Facility: CLINIC | Age: 72
End: 2022-04-21
Payer: MEDICARE

## 2022-04-21 DIAGNOSIS — G56.12 LEFT MEDIAN NERVE NEUROPATHY: ICD-10-CM

## 2022-04-21 DIAGNOSIS — G56.22 ULNAR NEUROPATHY OF LEFT UPPER EXTREMITY: ICD-10-CM

## 2022-04-21 DIAGNOSIS — M54.12 CERVICAL RADICULOPATHY: ICD-10-CM

## 2022-04-21 DIAGNOSIS — R29.898 HAND WEAKNESS: Primary | ICD-10-CM

## 2022-04-21 PROCEDURE — 95886 MUSC TEST DONE W/N TEST COMP: CPT | Performed by: PHYSICAL MEDICINE & REHABILITATION

## 2022-04-21 PROCEDURE — 95909 NRV CNDJ TST 5-6 STUDIES: CPT | Performed by: PHYSICAL MEDICINE & REHABILITATION

## 2022-04-26 PROBLEM — M54.12 CERVICAL RADICULOPATHY: Status: ACTIVE | Noted: 2022-04-26

## 2022-04-26 PROBLEM — G56.12 LEFT MEDIAN NERVE NEUROPATHY: Status: ACTIVE | Noted: 2022-04-26

## 2022-04-26 PROBLEM — G56.22 ULNAR NEUROPATHY OF LEFT UPPER EXTREMITY: Status: ACTIVE | Noted: 2022-04-26

## 2024-11-06 ENCOUNTER — HOSPITAL ENCOUNTER (OUTPATIENT)
Dept: GENERAL RADIOLOGY | Facility: HOSPITAL | Age: 74
Discharge: HOME | End: 2024-11-06
Attending: ORTHOPAEDIC SURGERY
Payer: MEDICARE

## 2024-11-06 ENCOUNTER — LAB ENCOUNTER (OUTPATIENT)
Dept: LAB | Facility: HOSPITAL | Age: 74
End: 2024-11-06
Attending: ORTHOPAEDIC SURGERY
Payer: MEDICARE

## 2024-11-06 DIAGNOSIS — M50.30 OTHER CERVICAL DISC DEGENERATION, UNSPECIFIED CERVICAL REGION: ICD-10-CM

## 2024-11-06 DIAGNOSIS — M54.51 VERTEBROGENIC LOW BACK PAIN: ICD-10-CM

## 2024-11-06 DIAGNOSIS — Z98.1 ARTHRODESIS STATUS: ICD-10-CM

## 2024-11-06 DIAGNOSIS — Z01.818 PREOP EXAMINATION: Primary | ICD-10-CM

## 2024-11-06 DIAGNOSIS — M54.2 CERVICALGIA: ICD-10-CM

## 2024-11-06 DIAGNOSIS — M48.062 SPINAL STENOSIS, LUMBAR REGION, WITH NEUROGENIC CLAUDICATION: ICD-10-CM

## 2024-11-06 LAB
ALBUMIN SERPL-MCNC: 4.8 G/DL (ref 3.2–4.8)
ALBUMIN/GLOB SERPL: 1.8 {RATIO} (ref 1–2)
ALP LIVER SERPL-CCNC: 81 U/L
ALT SERPL-CCNC: 23 U/L
ANION GAP SERPL CALC-SCNC: 11 MMOL/L (ref 0–18)
APTT PPP: 27.1 SECONDS (ref 23–36)
AST SERPL-CCNC: 37 U/L (ref ?–34)
ATRIAL RATE: 75 BPM
BASOPHILS # BLD AUTO: 0.05 X10(3) UL (ref 0–0.2)
BASOPHILS NFR BLD AUTO: 0.6 %
BILIRUB SERPL-MCNC: 0.8 MG/DL (ref 0.2–1.1)
BUN BLD-MCNC: 18 MG/DL (ref 9–23)
BUN/CREAT SERPL: 21.4 (ref 10–20)
CALCIUM BLD-MCNC: 10.1 MG/DL (ref 8.7–10.4)
CHLORIDE SERPL-SCNC: 107 MMOL/L (ref 98–112)
CO2 SERPL-SCNC: 24 MMOL/L (ref 21–32)
CREAT BLD-MCNC: 0.84 MG/DL
DEPRECATED RDW RBC AUTO: 46.3 FL (ref 35.1–46.3)
EGFRCR SERPLBLD CKD-EPI 2021: 72 ML/MIN/1.73M2 (ref 60–?)
EOSINOPHIL # BLD AUTO: 0.06 X10(3) UL (ref 0–0.7)
EOSINOPHIL NFR BLD AUTO: 0.7 %
ERYTHROCYTE [DISTWIDTH] IN BLOOD BY AUTOMATED COUNT: 13.6 % (ref 11–15)
FASTING STATUS PATIENT QL REPORTED: NO
GLOBULIN PLAS-MCNC: 2.7 G/DL (ref 2–3.5)
GLUCOSE BLD-MCNC: 90 MG/DL (ref 70–99)
HCT VFR BLD AUTO: 38 %
HGB BLD-MCNC: 12.8 G/DL
IMM GRANULOCYTES # BLD AUTO: 0.03 X10(3) UL (ref 0–1)
IMM GRANULOCYTES NFR BLD: 0.3 %
INR BLD: 0.95 (ref 0.8–1.2)
LYMPHOCYTES # BLD AUTO: 1.04 X10(3) UL (ref 1–4)
LYMPHOCYTES NFR BLD AUTO: 12 %
MCH RBC QN AUTO: 31 PG (ref 26–34)
MCHC RBC AUTO-ENTMCNC: 33.7 G/DL (ref 31–37)
MCV RBC AUTO: 92 FL
MONOCYTES # BLD AUTO: 0.48 X10(3) UL (ref 0.1–1)
MONOCYTES NFR BLD AUTO: 5.5 %
NEUTROPHILS # BLD AUTO: 7.01 X10 (3) UL (ref 1.5–7.7)
NEUTROPHILS # BLD AUTO: 7.01 X10(3) UL (ref 1.5–7.7)
NEUTROPHILS NFR BLD AUTO: 80.9 %
OSMOLALITY SERPL CALC.SUM OF ELEC: 295 MOSM/KG (ref 275–295)
P AXIS: 60 DEGREES
P-R INTERVAL: 216 MS
PLATELET # BLD AUTO: 202 10(3)UL (ref 150–450)
POTASSIUM SERPL-SCNC: 3.8 MMOL/L (ref 3.5–5.1)
PROT SERPL-MCNC: 7.5 G/DL (ref 5.7–8.2)
PROTHROMBIN TIME: 13.2 SECONDS (ref 11.6–14.8)
Q-T INTERVAL: 432 MS
QRS DURATION: 148 MS
QTC CALCULATION (BEZET): 482 MS
R AXIS: -59 DEGREES
RBC # BLD AUTO: 4.13 X10(6)UL
SODIUM SERPL-SCNC: 142 MMOL/L (ref 136–145)
T AXIS: 57 DEGREES
VENTRICULAR RATE: 75 BPM
WBC # BLD AUTO: 8.7 X10(3) UL (ref 4–11)

## 2024-11-06 PROCEDURE — 85730 THROMBOPLASTIN TIME PARTIAL: CPT

## 2024-11-06 PROCEDURE — 85610 PROTHROMBIN TIME: CPT

## 2024-11-06 PROCEDURE — 36415 COLL VENOUS BLD VENIPUNCTURE: CPT

## 2024-11-06 PROCEDURE — 85025 COMPLETE CBC W/AUTO DIFF WBC: CPT

## 2024-11-06 PROCEDURE — 93010 ELECTROCARDIOGRAM REPORT: CPT | Performed by: INTERNAL MEDICINE

## 2024-11-06 PROCEDURE — 80053 COMPREHEN METABOLIC PANEL: CPT

## 2024-11-06 PROCEDURE — 87641 MR-STAPH DNA AMP PROBE: CPT

## 2024-11-06 PROCEDURE — 71046 X-RAY EXAM CHEST 2 VIEWS: CPT | Performed by: ORTHOPAEDIC SURGERY

## 2024-11-06 PROCEDURE — 93005 ELECTROCARDIOGRAM TRACING: CPT

## 2024-11-07 LAB — MRSA DNA SPEC QL NAA+PROBE: NEGATIVE

## 2024-11-14 NOTE — H&P (VIEW-ONLY)
[facility] -  Family Medicine  Attending - Consult Note - Pre-op Risk Assessment                   Name:    Sherin Cheek AKALISSON Sherin Truong    Requesting Physician:   Jaret Swanson      Fax [phone]      Phone [phone]    Chief Complaint  Chief Complaint   Patient presents with   • Establish Care   • Pre-Op Exam     1/20/24 pt brought clearance form    L2-3 L3-4 decompressive laminectomy       History of Present Illness  Sherin Cheek is a 74 year old female who presents to the office for pre-operative medical optimization ahead of elective L2-3 and L3-4 decompressive laminectomy scheduled on 11/20/24 at [facility]).     Ms. Cheek has a medical history significant for depression, previous spinal surgeries, mixed urinary incontinence, macular degeneration, cough variant asthma.      Ms. Cheek has had no previous complications from anesthesia.  Denies a personal or family history of cardiac disease and is a non-smoker.  Reports no history of recurrent skin infections or MRSA.  She has no history of blood clots or bleeding diatheses.     Review of Systems: See HPI    Exercise capacity: some walking, weight classes at gym.     Revised Cardiac Risk Index (1 pt for each risk factor) Y=Yes, N=No  High-risk surgery (intraperitoneal, intrathoracic, suprainguinal vascular): N  History of ischemic heart disease (hx of MI, + stress test, CP 2/2 ischemia, use of nitrates, ECG with pathologic Q waves): N  History of heart failure (CHF, pulm edema, PND, bilateral rales/S3 gallop, pulm vasc redistribution on CXR): N  History of cerebrovascular disease (TIA/CVA): N  Preop treatment with insulin: N  Preop serum Cr > 2.0 mg/dL: N    Past Medical History  Past Medical History:   Diagnosis Date   • Anxiety    • Arthritis    • Asthma    • Cataract    • Depressed    • Hypertension    • Osteoporosis    • Other and unspecified symptoms and signs involving general sensations and perceptions            Past Surgical History  Past Surgical History:   Procedure Laterality Date   • HX EYE SURGERY     • HX SPINE SURGERY     • HX TONSILLECTOMY  1955   • HX TUBAL LIGATION  1985   • NO PAST SURGERIES  2021 2022          Family History  Family History[1]  Current Medications  Medications Ordered Prior to Encounter[2]   Allergies  Summary of allergies[3]  Social History  Tobacco History Pre-op Tobacco History[4]   Alcohol History   History   Alcohol Use   • Yes     Comment: maybe one drink every month or two     Drug History   Social History     Substance and Sexual Activity   Drug Use Yes   • Types: Cannabis, Codeine, Hydrocodone     STOPBANG  Snoring: Do you snore loudly (louder than talking or loud enough to be heard through closed doors)? N  Tiredness: Do you often feel tired, fatigued or sleepy during the daytime? Y  Observed apnea: Has anyone observed you stop breathing during your sleep? N  Pressure: Do you have or are you being treated for high blood pressure? Y  BMI: > 35 kg/m2 N  Age: > 50 yrs Y  Neck circumference: > 40 cm N  Gender: male N    Physical Exam  Vitals: /70   Pulse 73   Temp 96.1 °F (35.6 °C)   Ht 160 cm (5' 2.99\")   Wt 57.2 kg (126 lb)   SpO2 96%   BMI 22.33 kg/m²  Body mass index is 22.33 kg/m².    Constitutional: No distress.   Psychiatric: Mood, memory and affect unremarkable and judgment intact.  Eyes: EOM are normal. Pupils are equal, round, and reactive to light.  ENT: Oropharynx is clear and moist. No oropharyngeal exudate.  Neck: No cervical adenopathy. No JVD.  Cardiac: Regular rate and rhythm, no murmurs, rubs or gallops  Pulmonary: No increased work of breathing. Good aeration diffusely. No wheezes, rhonchi or rales.  Abdomen: Bowel sounds present. Soft, non-tender, non-distended. No guarding or rebound tenderness.  Musculoskeletal: Moving all four extremities.   Neurological: Alert and oriented to person, place, and time. No cranial nerve deficit. No motor or  sensory deficits in upper or lower extremities.  Skin: Warm and dry. No rash noted. Not diaphoretic. No erythema. No pallor.   Lower Extremities: No edema    Labs:  Labs drawn at [facility] 11/6/2024 reviewed using Care Everywhere  PT 13.2, INR 0.95, PTT 27.1  CBC within normal limits  CMP with BUN to creatinine ratio 21.4, AST 37.  Otherwise normal  MRSA screen by PCR negative.    EKG 11/6/24  Sinus rhythm with 1st degree A-V block   Left axis deviation   Left bundle branch block   Abnormal ECG     EKG 3/13/2009  Sinus rhythm with 1st degree A-V block   Left anterior fascicular block   Left ventricular hypertrophy with strain with QRS widening   Abnormal ECG     Chest x-ray 2 views 11/6/2024: No acute cardiopulmonary process.      Impression  Cardiac Risk: Patient carries a Revised Cardiac Risk Index score of 0.  Her EKG shows first-degree AV block with left bundle tanner block.  These EKG findings are not significantly different from 15 years ago. She has adequate functional capacity of at least 4 METS based on the [facility] Activity Status Index rating system.    Pulmonary Risk: The patient has the following risk factors for post-operative pulmonary complications: Cough variant asthma    STOP BANG score: 3  < 3 = low risk of REGINA  3-4 = high risk of REGINA  5-8: high probability of moderate-to-severe REGINA    Recommendations  At this time Ms. Cheek is medically optimized for surgery per ACC/AHA guidelines.    DIAGNOSES    Lumbar spinal stenosis.  Cleared for surgery  Unrelated to surgery: Financial security, for which social work consult was placed.  Unrelated to surgery: Mixed incontinence.  Patient has been seeing a urogynecologist at [place] but would like to switch over to [facility].  Referral for Dr. Nguyen given.    I spent 50 minutes on the day of the encounter related to this visit, not including separately reported activities or procedures.  This may have included non face-to-face time activities such as  same day chart review in preparing to see the patient, orders, documentation in the electronic medical record, and/or communication with other healthcare professionals or family members/caregivers.    Libra Baca MD  Answers submitted by the patient for this visit:  Primary Reason for Visit (Submitted on 11/13/2024)  What is the primary reason for your visit?: Physical Exam  Reason for Visit (Submitted on 11/13/2024)  Chief Complaint: Physical Exam Data  Comments: surgery  L2-3 and L3-4 decompressive laminectomy  good general health: No  Fatigue: Yes  change in appetite: Yes  recent weight change: No  sleep difficulty: Yes  blurred or double vision: No  eye disease or injury: Yes  Wear glasses/contact lens: Yes  bleeding gums: No  Chronic sinus problem or rhinitis: No  Hearing loss or ringing: No  Nose bleeds: No  sore throat: Yes  chest pain: No  Swelling of feet, ankles or hands: No  Palpitations: No  Shortness of breath with walking or lying flat: No  Chronic or frequent cough: Yes  Shortness of breath: No  wheezing: No  Painful bowel movements or constipation: Yes  Rectal bleeding or blood in stool: No  Nausea or vomiting: Yes  Frequent diarrhea: Yes  Abdominal pain or heartburn: Yes  Breast discharge: No  Breast lump: No  Seasonal or environmental allergies: No  Back pain: Yes  Joint pain, stiffness or swelling: Yes  Muscle pain or cramps: Yes  Change in hair or nails: No  Varicose veins: Yes  Rash or itching: Yes  Blackout or fainting: No  Frequent or recurring headaches: Yes  Tremors: No  Light headed or dizzy: Yes  Numbness or tingling sensations: Yes  Memory loss or confusion: Yes  Nervousness: Yes  Slow to heal after cuts: No  Bleeding or bruising tendency: No  Past blood transfusion: No  Excessive thirst or urination: No  Heat or cold intolerance: No           [1]   Family History  Problem Relation Name Age of Onset   • Heart Sister Natalia Lewis         Heart functions at low percentage   • Heart Brother  Eric Cheek         stent   [2]   No current outpatient medications on file prior to visit.     No current facility-administered medications on file prior to visit.   [3] Levonorgestrel-ethinyl estrad  [4]   Social History  Tobacco Use   Smoking Status Never   Smokeless Tobacco Never

## 2024-11-15 RX ORDER — ATORVASTATIN CALCIUM 20 MG/1
20 TABLET, FILM COATED ORAL NIGHTLY
COMMUNITY

## 2024-11-15 RX ORDER — VALSARTAN AND HYDROCHLOROTHIAZIDE 80; 12.5 MG/1; MG/1
1 TABLET, FILM COATED ORAL DAILY
COMMUNITY

## 2024-11-15 RX ORDER — ACETAMINOPHEN AND CODEINE PHOSPHATE 300; 30 MG/1; MG/1
1 TABLET ORAL EVERY 6 HOURS PRN
COMMUNITY

## 2024-11-15 RX ORDER — ROMOSOZUMAB-AQQG 105 MG/1.17ML
INJECTION, SOLUTION SUBCUTANEOUS
COMMUNITY

## 2024-11-15 RX ORDER — CYCLOBENZAPRINE HCL 5 MG
5 TABLET ORAL 3 TIMES DAILY PRN
COMMUNITY

## 2024-12-09 ENCOUNTER — ANESTHESIA (OUTPATIENT)
Dept: SURGERY | Facility: HOSPITAL | Age: 74
End: 2024-12-09
Payer: MEDICARE

## 2024-12-09 ENCOUNTER — HOSPITAL ENCOUNTER (OUTPATIENT)
Facility: HOSPITAL | Age: 74
Discharge: HOME | End: 2024-12-10
Attending: ORTHOPAEDIC SURGERY | Admitting: ORTHOPAEDIC SURGERY
Payer: MEDICARE

## 2024-12-09 ENCOUNTER — ANESTHESIA EVENT (OUTPATIENT)
Dept: SURGERY | Facility: HOSPITAL | Age: 74
End: 2024-12-09
Payer: MEDICARE

## 2024-12-09 ENCOUNTER — APPOINTMENT (OUTPATIENT)
Dept: GENERAL RADIOLOGY | Facility: HOSPITAL | Age: 74
End: 2024-12-09
Attending: ORTHOPAEDIC SURGERY
Payer: MEDICARE

## 2024-12-09 PROCEDURE — 01NB0ZZ RELEASE LUMBAR NERVE, OPEN APPROACH: ICD-10-PCS | Performed by: ORTHOPAEDIC SURGERY

## 2024-12-09 PROCEDURE — 99215 OFFICE O/P EST HI 40 MIN: CPT | Performed by: INTERNAL MEDICINE

## 2024-12-09 PROCEDURE — 76000 FLUOROSCOPY <1 HR PHYS/QHP: CPT | Performed by: ORTHOPAEDIC SURGERY

## 2024-12-09 RX ORDER — TRANEXAMIC ACID 10 MG/ML
INJECTION, SOLUTION INTRAVENOUS AS NEEDED
Status: DISCONTINUED | OUTPATIENT
Start: 2024-12-09 | End: 2024-12-09 | Stop reason: SURG

## 2024-12-09 RX ORDER — HYDRALAZINE HYDROCHLORIDE 20 MG/ML
INJECTION INTRAMUSCULAR; INTRAVENOUS AS NEEDED
Status: DISCONTINUED | OUTPATIENT
Start: 2024-12-09 | End: 2024-12-09 | Stop reason: SURG

## 2024-12-09 RX ORDER — OXYCODONE HYDROCHLORIDE 5 MG/1
5 TABLET ORAL EVERY 4 HOURS PRN
Status: DISCONTINUED | OUTPATIENT
Start: 2024-12-09 | End: 2024-12-10

## 2024-12-09 RX ORDER — CYCLOBENZAPRINE HCL 5 MG
5 TABLET ORAL 3 TIMES DAILY PRN
Status: DISCONTINUED | OUTPATIENT
Start: 2024-12-09 | End: 2024-12-10

## 2024-12-09 RX ORDER — ONDANSETRON 2 MG/ML
4 INJECTION INTRAMUSCULAR; INTRAVENOUS EVERY 6 HOURS PRN
Status: DISCONTINUED | OUTPATIENT
Start: 2024-12-09 | End: 2024-12-10

## 2024-12-09 RX ORDER — OXYCODONE HYDROCHLORIDE 5 MG/1
2.5 TABLET ORAL EVERY 4 HOURS PRN
Status: DISCONTINUED | OUTPATIENT
Start: 2024-12-09 | End: 2024-12-10

## 2024-12-09 RX ORDER — SODIUM PHOSPHATE, DIBASIC AND SODIUM PHOSPHATE, MONOBASIC 7; 19 G/230ML; G/230ML
1 ENEMA RECTAL ONCE AS NEEDED
Status: DISCONTINUED | OUTPATIENT
Start: 2024-12-09 | End: 2024-12-10

## 2024-12-09 RX ORDER — SODIUM CHLORIDE, SODIUM LACTATE, POTASSIUM CHLORIDE, CALCIUM CHLORIDE 600; 310; 30; 20 MG/100ML; MG/100ML; MG/100ML; MG/100ML
INJECTION, SOLUTION INTRAVENOUS CONTINUOUS
Status: DISCONTINUED | OUTPATIENT
Start: 2024-12-09 | End: 2024-12-10

## 2024-12-09 RX ORDER — BUPIVACAINE HYDROCHLORIDE 5 MG/ML
INJECTION, SOLUTION EPIDURAL; INTRACAUDAL AS NEEDED
Status: DISCONTINUED | OUTPATIENT
Start: 2024-12-09 | End: 2024-12-09 | Stop reason: HOSPADM

## 2024-12-09 RX ORDER — PHENYLEPHRINE HCL 10 MG/ML
VIAL (ML) INJECTION AS NEEDED
Status: DISCONTINUED | OUTPATIENT
Start: 2024-12-09 | End: 2024-12-09 | Stop reason: SURG

## 2024-12-09 RX ORDER — SODIUM CHLORIDE, SODIUM LACTATE, POTASSIUM CHLORIDE, CALCIUM CHLORIDE 600; 310; 30; 20 MG/100ML; MG/100ML; MG/100ML; MG/100ML
INJECTION, SOLUTION INTRAVENOUS CONTINUOUS
Status: DISCONTINUED | OUTPATIENT
Start: 2024-12-09 | End: 2024-12-09 | Stop reason: HOSPADM

## 2024-12-09 RX ORDER — DIPHENHYDRAMINE HCL 25 MG
25 CAPSULE ORAL EVERY 4 HOURS PRN
Status: DISCONTINUED | OUTPATIENT
Start: 2024-12-09 | End: 2024-12-10

## 2024-12-09 RX ORDER — NALOXONE HYDROCHLORIDE 0.4 MG/ML
0.08 INJECTION, SOLUTION INTRAMUSCULAR; INTRAVENOUS; SUBCUTANEOUS AS NEEDED
Status: DISCONTINUED | OUTPATIENT
Start: 2024-12-09 | End: 2024-12-09 | Stop reason: HOSPADM

## 2024-12-09 RX ORDER — LIDOCAINE HYDROCHLORIDE 10 MG/ML
INJECTION, SOLUTION EPIDURAL; INFILTRATION; INTRACAUDAL; PERINEURAL AS NEEDED
Status: DISCONTINUED | OUTPATIENT
Start: 2024-12-09 | End: 2024-12-09 | Stop reason: SURG

## 2024-12-09 RX ORDER — METOCLOPRAMIDE HYDROCHLORIDE 5 MG/ML
10 INJECTION INTRAMUSCULAR; INTRAVENOUS EVERY 8 HOURS PRN
Status: DISCONTINUED | OUTPATIENT
Start: 2024-12-09 | End: 2024-12-10

## 2024-12-09 RX ORDER — ACETAMINOPHEN 500 MG
1000 TABLET ORAL EVERY 8 HOURS SCHEDULED
Status: DISCONTINUED | OUTPATIENT
Start: 2024-12-09 | End: 2024-12-10

## 2024-12-09 RX ORDER — DIPHENHYDRAMINE HYDROCHLORIDE 50 MG/ML
25 INJECTION INTRAMUSCULAR; INTRAVENOUS EVERY 4 HOURS PRN
Status: DISCONTINUED | OUTPATIENT
Start: 2024-12-09 | End: 2024-12-10

## 2024-12-09 RX ORDER — HYDROMORPHONE HYDROCHLORIDE 1 MG/ML
0.2 INJECTION, SOLUTION INTRAMUSCULAR; INTRAVENOUS; SUBCUTANEOUS EVERY 5 MIN PRN
Status: DISCONTINUED | OUTPATIENT
Start: 2024-12-09 | End: 2024-12-09 | Stop reason: HOSPADM

## 2024-12-09 RX ORDER — DOCUSATE SODIUM 100 MG/1
100 CAPSULE, LIQUID FILLED ORAL 2 TIMES DAILY
Status: DISCONTINUED | OUTPATIENT
Start: 2024-12-09 | End: 2024-12-10

## 2024-12-09 RX ORDER — DEXAMETHASONE SODIUM PHOSPHATE 4 MG/ML
VIAL (ML) INJECTION AS NEEDED
Status: DISCONTINUED | OUTPATIENT
Start: 2024-12-09 | End: 2024-12-09 | Stop reason: SURG

## 2024-12-09 RX ORDER — VALSARTAN AND HYDROCHLOROTHIAZIDE 80; 12.5 MG/1; MG/1
1 TABLET, FILM COATED ORAL DAILY
Status: DISCONTINUED | OUTPATIENT
Start: 2024-12-09 | End: 2024-12-09

## 2024-12-09 RX ORDER — POLYETHYLENE GLYCOL 3350 17 G/17G
17 POWDER, FOR SOLUTION ORAL DAILY PRN
Status: DISCONTINUED | OUTPATIENT
Start: 2024-12-09 | End: 2024-12-10

## 2024-12-09 RX ORDER — ACETAMINOPHEN 500 MG
1000 TABLET ORAL ONCE
Status: DISCONTINUED | OUTPATIENT
Start: 2024-12-09 | End: 2024-12-09 | Stop reason: HOSPADM

## 2024-12-09 RX ORDER — HYDROMORPHONE HYDROCHLORIDE 1 MG/ML
0.4 INJECTION, SOLUTION INTRAMUSCULAR; INTRAVENOUS; SUBCUTANEOUS EVERY 2 HOUR PRN
Status: DISCONTINUED | OUTPATIENT
Start: 2024-12-09 | End: 2024-12-10

## 2024-12-09 RX ORDER — SENNOSIDES 8.6 MG
17.2 TABLET ORAL NIGHTLY
Status: DISCONTINUED | OUTPATIENT
Start: 2024-12-09 | End: 2024-12-10

## 2024-12-09 RX ORDER — ROCURONIUM BROMIDE 10 MG/ML
INJECTION, SOLUTION INTRAVENOUS AS NEEDED
Status: DISCONTINUED | OUTPATIENT
Start: 2024-12-09 | End: 2024-12-09 | Stop reason: SURG

## 2024-12-09 RX ORDER — VANCOMYCIN HYDROCHLORIDE 1 G/20ML
INJECTION, POWDER, LYOPHILIZED, FOR SOLUTION INTRAVENOUS AS NEEDED
Status: DISCONTINUED | OUTPATIENT
Start: 2024-12-09 | End: 2024-12-09 | Stop reason: HOSPADM

## 2024-12-09 RX ORDER — HYDROMORPHONE HYDROCHLORIDE 1 MG/ML
0.2 INJECTION, SOLUTION INTRAMUSCULAR; INTRAVENOUS; SUBCUTANEOUS EVERY 2 HOUR PRN
Status: DISCONTINUED | OUTPATIENT
Start: 2024-12-09 | End: 2024-12-10

## 2024-12-09 RX ORDER — METHOCARBAMOL 100 MG/ML
500 INJECTION, SOLUTION INTRAMUSCULAR; INTRAVENOUS ONCE
Status: COMPLETED | OUTPATIENT
Start: 2024-12-09 | End: 2024-12-09

## 2024-12-09 RX ORDER — SERTRALINE HYDROCHLORIDE 100 MG/1
100 TABLET, FILM COATED ORAL EVERY MORNING
Status: DISCONTINUED | OUTPATIENT
Start: 2024-12-10 | End: 2024-12-10

## 2024-12-09 RX ORDER — ONDANSETRON 2 MG/ML
INJECTION INTRAMUSCULAR; INTRAVENOUS AS NEEDED
Status: DISCONTINUED | OUTPATIENT
Start: 2024-12-09 | End: 2024-12-09 | Stop reason: SURG

## 2024-12-09 RX ORDER — BISACODYL 10 MG
10 SUPPOSITORY, RECTAL RECTAL
Status: DISCONTINUED | OUTPATIENT
Start: 2024-12-09 | End: 2024-12-10

## 2024-12-09 RX ORDER — ATORVASTATIN CALCIUM 20 MG/1
20 TABLET, FILM COATED ORAL NIGHTLY
Status: DISCONTINUED | OUTPATIENT
Start: 2024-12-09 | End: 2024-12-10

## 2024-12-09 RX ORDER — ONDANSETRON 2 MG/ML
4 INJECTION INTRAMUSCULAR; INTRAVENOUS EVERY 6 HOURS PRN
Status: DISCONTINUED | OUTPATIENT
Start: 2024-12-09 | End: 2024-12-09 | Stop reason: HOSPADM

## 2024-12-09 RX ORDER — ACETAMINOPHEN 10 MG/ML
1000 INJECTION, SOLUTION INTRAVENOUS ONCE
Status: COMPLETED | OUTPATIENT
Start: 2024-12-09 | End: 2024-12-09

## 2024-12-09 RX ADMIN — PHENYLEPHRINE HCL 100 MCG: 10 MG/ML VIAL (ML) INJECTION at 08:54:00

## 2024-12-09 RX ADMIN — SODIUM CHLORIDE, SODIUM LACTATE, POTASSIUM CHLORIDE, CALCIUM CHLORIDE: 600; 310; 30; 20 INJECTION, SOLUTION INTRAVENOUS at 10:17:00

## 2024-12-09 RX ADMIN — LIDOCAINE HYDROCHLORIDE 50 MG: 10 INJECTION, SOLUTION EPIDURAL; INFILTRATION; INTRACAUDAL; PERINEURAL at 07:35:00

## 2024-12-09 RX ADMIN — SODIUM CHLORIDE, SODIUM LACTATE, POTASSIUM CHLORIDE, CALCIUM CHLORIDE: 600; 310; 30; 20 INJECTION, SOLUTION INTRAVENOUS at 07:52:00

## 2024-12-09 RX ADMIN — ROCURONIUM BROMIDE 50 MG: 10 INJECTION, SOLUTION INTRAVENOUS at 07:37:00

## 2024-12-09 RX ADMIN — SODIUM CHLORIDE, SODIUM LACTATE, POTASSIUM CHLORIDE, CALCIUM CHLORIDE: 600; 310; 30; 20 INJECTION, SOLUTION INTRAVENOUS at 08:45:00

## 2024-12-09 RX ADMIN — TRANEXAMIC ACID 1000 MG: 10 INJECTION, SOLUTION INTRAVENOUS at 08:12:00

## 2024-12-09 RX ADMIN — HYDRALAZINE HYDROCHLORIDE 10 MG: 20 INJECTION INTRAMUSCULAR; INTRAVENOUS at 10:24:00

## 2024-12-09 RX ADMIN — DEXAMETHASONE SODIUM PHOSPHATE 4 MG: 4 MG/ML VIAL (ML) INJECTION at 07:51:00

## 2024-12-09 RX ADMIN — ONDANSETRON 4 MG: 2 INJECTION INTRAMUSCULAR; INTRAVENOUS at 07:47:00

## 2024-12-09 RX ADMIN — ROCURONIUM BROMIDE 10 MG: 10 INJECTION, SOLUTION INTRAVENOUS at 08:09:00

## 2024-12-09 RX ADMIN — SODIUM CHLORIDE, SODIUM LACTATE, POTASSIUM CHLORIDE, CALCIUM CHLORIDE: 600; 310; 30; 20 INJECTION, SOLUTION INTRAVENOUS at 09:07:00

## 2024-12-09 NOTE — CONSULTS
[facility]  part of [facility]  Consult Note       Sherin Truong Patient Status:  Outpatient in a Bed    1950  74 year old CSN [id number]   Location [facility] PACU/[facility] PACU Attending Jaret Swanson*     PCP Libra Baca MD         DATE OF ADMISSION: 2024     CHIEF COMPLAINT: back pain    HISTORY OF PRESENT ILLNESS  This is a 74 year oldfemale with history of spinal stenosis who underwent L2-3 and L3-4 decompressive laminectomy. Patient was admitted post operatively for closer monitoring and care. At time of interview, patient reported pain was well controlled. Denied current nausea or vomiting. Patient otherwise denied CP, SOB, abd pain, F/C    PAST MEDICAL HISTORY  Past Medical History:    Back problem    Calculus of kidney    Cataract    Depression    Fibromyalgia    High blood pressure    High cholesterol    Migraines    Muscle weakness    Osteoarthritis    Osteoporosis    PONV (postoperative nausea and vomiting)    UTI (urinary tract infection)    Recurrent    Visual impairment    Macular Degeneration        PAST SURGICAL HISTORY  Past Surgical History:   Procedure Laterality Date    Adenoidectomy      Anesth,skin surgery,neck      Cataract Bilateral     Cystoscopy,insert ureteral stent      Other      Left breast Clip    Revision of eyelid      Spine surgery procedure unlisted  10/27/2021     L4-5 anterior lumbar interbody fusion with posterior instrumentation L4-L5 and possible L4-5 posterior lateral fusion and possible L4-5 decompressive laminectomy and use of allograft,possible use of bone morphogenic     Spine surgery procedure unlisted  2021    Anterior cervial fusion- Dr. Swanson    Tonsillectomy         ALLERGIES   Patient has no known allergies.    MEDICATIONS  Current Discharge Medication List        CONTINUE these medications which have NOT CHANGED    Details   acetaminophen-codeine 300-30 MG Oral Tab Take 1 tablet by mouth every 6 (six) hours as  needed for Pain.      atorvastatin 20 MG Oral Tab Take 1 tablet (20 mg total) by mouth nightly.      valsartan-hydroCHLOROthiazide 80-12.5 MG Oral Tab Take 1 tablet by mouth daily.      Romosozumab-aqqg (EVENITY) 105 MG/1.17ML Subcutaneous Solution Prefilled Syringe Inject into the skin.      sertraline 100 MG Oral Tab Take 1 tablet (100 mg total) by mouth every morning.      SUMAtriptan Succinate 100 MG Oral Tab Take 1 tablet (100 mg total) by mouth every 2 (two) hours as needed for Migraine.      Multiple Vitamin (MULTIVITAMIN TAB/CAP) Take 1 tablet by mouth every morning.      cyclobenzaprine 5 MG Oral Tab Take 1 tablet (5 mg total) by mouth 3 (three) times daily as needed for Muscle spasms.             SOCIAL HISTORY  Social History     Socioeconomic History    Marital status:    Tobacco Use    Smoking status: Former     Current packs/day: 0.00     Types: Cigarettes     Quit date: 1970     Years since quittin.9    Smokeless tobacco: Never   Vaping Use    Vaping status: Some Days    Substances: CBD   Substance and Sexual Activity    Alcohol use: Not Currently    Drug use: Yes     Types: Cannabis     Comment: 2-3 times weekly - vape and edibles       FAMILY HISTORY  Family History   Problem Relation Age of Onset    Heart Disorder Father     Infectious Disease Mother        PHYSICAL EXAM  Vital signs:  /63 (BP Location: Right arm)   Pulse 71   Temp 98.1 °F (36.7 °C) (Temporal)   Resp 15   Ht 5' 3\" (1.6 m)   Wt 126 lb (57.2 kg)   SpO2 97%   BMI 22.32 kg/m²      GENERAL:  Awake and alert, in no acute distress.  HEART:  Regular rhythm.  Regular rate   LUNGS:  Air entry was good.  No crackles or wheezes   ABDOMEN: Soft and non-tender.   MUSCULOSKELETAL:  Dressing in place, drain with serosanguinous drainage  PSYCHIATRIC: Normal mood      IMAGING  No results found.    Data:  No results for input(s): \"RBC\", \"HGB\", \"HCT\", \"MCV\", \"MCH\", \"MCHC\", \"RDW\", \"NEPRELIM\", \"WBC\", \"PLT\" in the last 168  hours.  No results for input(s): \"GLU\", \"BUN\", \"CREATSERUM\", \"GFRAA\", \"GFRNAA\", \"CA\", \"ALB\", \"NA\", \"K\", \"CL\", \"CO2\", \"ALKPHO\", \"AST\", \"ALT\", \"BILT\", \"TP\" in the last 168 hours.    ASSESSMENT/PLAN    Spinal stenosis lumbar region with neurogenic claudication   -S/p L2-3 and L3-4 decompressive laminectomy on 12/9  - cont pain control, close monitoring with IV narcotics  - Encourage Incentive spirometry  - PT/OT per Ortho  - F/u Ortho recs    HTN  - Mildly low  - Hold home BP meds today. Plan for dose in am pending blood pressure  - Monitor and adjust agents as needed    Hyperlipidemia  -Statin        Plan of care discussed with patient at bedside.  Discussed with  RN.      Chip Mchugh MD    This note was prepared using Dragon Medical voice recognition dictation software. As a result errors may occur. When identified these errors have been corrected. While every attempt is made to correct errors during dictation discrepancies may still exist

## 2024-12-09 NOTE — ANESTHESIA PROCEDURE NOTES
Airway  Date/Time: 12/9/2024 7:40 AM  Urgency: elective    Airway not difficult    General Information and Staff    Patient location during procedure: OR  Anesthesiologist: Arnoldo Burkett MD  Performed: anesthesiologist   Performed by: Arnoldo Burkett MD  Authorized by: Arnoldo Burkett MD      Indications and Patient Condition  Indications for airway management: anesthesia  Spontaneous Ventilation: absent  Sedation level: deep  Preoxygenated: yes  Patient position: sniffing  MILS maintained throughout  Mask difficulty assessment: 1 - vent by mask  No planned trial extubation    Final Airway Details  Final airway type: endotracheal airway      Successful airway: ETT  Cuffed: yes   Successful intubation technique: Video laryngoscopy  Facilitating devices/methods: intubating stylet  Endotracheal tube insertion site: oral  ETT size (mm): 7.0    Cormack-Lehane Classification: grade I - full view of glottis  Placement verified by: capnometry   Cuff volume (mL): 3  Measured from: lips  ETT to lips (cm): 20  Number of attempts at approach: 1  Number of other approaches attempted: 0    Additional Comments  PreO2 to 100%, smooth titrated induction, EZ mask, JERZY.  Waited full 3 minutes to relax, still not the best jaw opening as expected, but Glidescope in x 1, full view, atraumatic.  Very careful not to touch lips or teeth.  Cuff MOP, BBS.  OG in & out x 1 to decompress stomach.

## 2024-12-09 NOTE — BRIEF OP NOTE
Pre-Operative Diagnosis: Spinal stenosis lumbar region with neurogenic claudication     Post-Operative Diagnosis: Spinal stenosis lumbar region with neurogenic claudication      Procedure Performed:   L2-3 and L3-4 decompressive laminectomy    Surgeons and Role:     * Jaret Swanson DO - Primary    Assistant(s):  Surgical Assistant.: Rigo Abel     Surgical Findings: Severe stenosis L2-3 and L3-4     Specimen: None Sent     Estimated Blood Loss: Blood Output: 50 mL (12/9/2024 10:00 AM)      Will stay overnight - outpatient in a bed  Post op abx  Maintain Hemovac drain   Pain control  Incentive spirometry  PT/OT - up with assist - avoid excessive bending, lifting or twisting  Mechanical DVT ppx - please NO CHEMICAL DVT PPX  Discharge planning - anticipate to home    Jaret Swanson DO  12/9/2024  10:19 AM

## 2024-12-09 NOTE — ANESTHESIA PREPROCEDURE EVALUATION
Anesthesia PreOp Note    HPI:     Sherin Truong is a 74 year old female who presents for preoperative consultation requested by: Jaret Swanson DO    Date of Surgery: 12/9/2024    Procedure(s):  L2-3 and L3-4 decompressive laminectomy  Indication: Spinal stenosis lumbar region with neurogenic claudication    Relevant Problems   No relevant active problems       NPO:                         History Review:  Patient Active Problem List    Diagnosis Date Noted    Cervical radiculopathy 04/26/2022    Ulnar neuropathy of left upper extremity 04/26/2022    Left median nerve neuropathy 04/26/2022    Hand weakness 03/25/2022    Fibromyalgia     Depression     Osteoporosis     Cervical spinal stenosis 12/08/2021    S/P cervical spinal fusion 12/08/2021    Preop testing     Spondylolisthesis of lumbar region 10/27/2021    Hyperlipidemia 10/27/2021    S/P lumbar fusion 10/27/2021       Past Medical History:    Back problem    Calculus of kidney    Cataract    Depression    Fibromyalgia    High blood pressure    High cholesterol    Migraines    Muscle weakness    Osteoarthritis    Osteoporosis    PONV (postoperative nausea and vomiting)    UTI (urinary tract infection)    Recurrent    Visual impairment    Macular Degeneration       Past Surgical History:   Procedure Laterality Date    Adenoidectomy      Anesth,skin surgery,neck  2021    Cataract Bilateral     Cystoscopy,insert ureteral stent      Other      Left breast Clip    Revision of eyelid      Spine surgery procedure unlisted  10/27/2021     L4-5 anterior lumbar interbody fusion with posterior instrumentation L4-L5 and possible L4-5 posterior lateral fusion and possible L4-5 decompressive laminectomy and use of allograft,possible use of bone morphogenic     Spine surgery procedure unlisted  12/08/2021    Anterior cervial fusion- Dr. Swanson    Tonsillectomy         Prescriptions Prior to Admission[1]  Current Medications and Prescriptions Ordered in  Epic[2]    Allergies[3]    Family History   Problem Relation Age of Onset    Heart Disorder Father     Infectious Disease Mother      Social History     Socioeconomic History    Marital status:    Tobacco Use    Smoking status: Former     Current packs/day: 0.00     Types: Cigarettes     Quit date: 1970     Years since quittin.9    Smokeless tobacco: Never   Vaping Use    Vaping status: Some Days    Substances: CBD   Substance and Sexual Activity    Alcohol use: Not Currently    Drug use: Yes     Types: Cannabis     Comment: 2-3 times weekly - vape and edibles       Available pre-op labs reviewed.  Lab Results   Component Value Date    WBC 8.7 2024    RBC 4.13 2024    HGB 12.8 2024    HCT 38.0 2024    MCV 92.0 2024    MCH 31.0 2024    MCHC 33.7 2024    RDW 13.6 2024    .0 2024     Lab Results   Component Value Date     2024    K 3.8 2024     2024    CO2 24.0 2024    BUN 18 2024    CREATSERUM 0.84 2024    GLU 90 2024    CA 10.1 2024          Vital Signs:  Body mass index is 22.32 kg/m².   height is 1.6 m (5' 3\") and weight is 57.2 kg (126 lb). Her blood pressure is 126/72 and her pulse is 66. Her respiration is 18 and oxygen saturation is 95%.   Vitals:    11/15/24 1141 24 0649   BP:  126/72   Pulse:  66   Resp:  18   TempSrc:  Oral   SpO2:  95%   Weight: 57.2 kg (126 lb) 57.2 kg (126 lb)   Height: 1.6 m (5' 3\") 1.6 m (5' 3\")        Anesthesia Evaluation     Patient summary reviewed and Nursing notes reviewed    History of anesthetic complications   Airway   Mallampati: II  TM distance: >3 FB  Neck ROM: limited  Dental      Pulmonary - normal exam    breath sounds clear to auscultation  Cardiovascular   Exercise tolerance: poor  (+) hypertension well controlled    ECG reviewed  Rhythm: regular  Rate: normal    Neuro/Psych    (+)  neuromuscular disease,  depression       GI/Hepatic/Renal      Endo/Other    Abdominal                  Anesthesia Plan:   ASA:  3  Plan:   General  Airway:  ETT  Post-op Pain Management: IV analgesics  Plan Comments: Prone.  PONV.  Sherin & Robert.  Frail.  LBBB.  Not seen by cardiology.  No change in physical activity. Diarrhea recently, \"from irritable bowel. I alternate between constipation and diarrhea.   Informed Consent Plan and Risks Discussed With:  Patient and spouse  Use of Blood Products Discussed With:  Patient and spouse      I have informed Sherin Truong and/or legal guardian or family member of the nature of the anesthetic plan, benefits, risks including possible dental damage if relevant, major complications, and any alternative forms of anesthetic management.   All of the patient's questions were answered to the best of my ability. The patient desires the anesthetic management as planned.  Arnoldo Burkett MD  12/9/2024 6:53 AM  Present on Admission:  **None**           [1]   Medications Prior to Admission   Medication Sig Dispense Refill Last Dose/Taking    acetaminophen-codeine 300-30 MG Oral Tab Take 1 tablet by mouth every 6 (six) hours as needed for Pain.   12/9/2024 at  5:00 AM    atorvastatin 20 MG Oral Tab Take 1 tablet (20 mg total) by mouth nightly.   12/8/2024 at 10:30 PM    valsartan-hydroCHLOROthiazide 80-12.5 MG Oral Tab Take 1 tablet by mouth daily.   12/8/2024 at  1:00 PM    Romosozumab-aqqg (EVENITY) 105 MG/1.17ML Subcutaneous Solution Prefilled Syringe Inject into the skin.   11/1/2024    sertraline 100 MG Oral Tab Take 1 tablet (100 mg total) by mouth every morning.   12/9/2024 at  5:00 AM    SUMAtriptan Succinate 100 MG Oral Tab Take 1 tablet (100 mg total) by mouth every 2 (two) hours as needed for Migraine.   12/2/2024    Multiple Vitamin (MULTIVITAMIN TAB/CAP) Take 1 tablet by mouth every morning.   12/2/2024    cyclobenzaprine 5 MG Oral Tab Take 1 tablet (5 mg total) by mouth 3 (three) times daily as needed  for Muscle spasms.   12/2/2024   [2]   Current Facility-Administered Medications Ordered in Epic   Medication Dose Route Frequency Provider Last Rate Last Admin    lactated ringers infusion   Intravenous Continuous Jaret Swanson,         acetaminophen (Tylenol Extra Strength) tab 1,000 mg  1,000 mg Oral Once Jaret Swanson DO        ceFAZolin (Ancef) 2g in 10mL IV syringe premix  2 g Intravenous Once Jaret Swanson DO         No current [facility]-ordered outpatient medications on file.   [3] No Known Allergies

## 2024-12-09 NOTE — INTERVAL H&P NOTE
Pre-op Diagnosis: Spinal stenosis lumbar region with neurogenic claudication    The above referenced H&P was reviewed by Jaret Swanson DO on 12/9/2024, the patient was examined and no significant changes have occurred in the patient's condition since the H&P was performed.  I discussed with the patient and/or legal representative the potential benefits, risks and side effects of this procedure; the likelihood of the patient achieving goals; and potential problems that might occur during recuperation.  I discussed reasonable alternatives to the procedure, including risks, benefits and side effects related to the alternatives and risks related to not receiving this procedure.  We will proceed with procedure as planned.

## 2024-12-09 NOTE — ANESTHESIA POSTPROCEDURE EVALUATION
Patient: Sherin Truong    Procedure Summary       Date: 12/09/24 Room / Location: [facility] MAIN OR  / [facility] MAIN OR    Anesthesia Start: 0729 Anesthesia Stop: 1047    Procedure: L2-3 and L3-4 decompressive laminectomy (Spine Lumbar) Diagnosis: (Spinal stenosis lumbar region with neurogenic claudication)    Surgeons: Jaret Swanson DO Anesthesiologist: Arnoldo Burkett MD    Anesthesia Type: general ASA Status: 3            Anesthesia Type: general    Vitals Value Taken Time   /62 12/09/24 1046   Temp 98.2 12/09/24 1047   Pulse 76 12/09/24 1046   Resp 16 12/09/24 1046   SpO2 97 % on 2 lpm 12/09/24 1046   Vitals shown include unfiled device data.    [facility] AN Post Evaluation:   Patient Evaluated in PACU  Patient Participation: complete - patient participated  Level of Consciousness: awake and alert  Pain Score: 9  Pain Management: adequate  Airway Patency:  Dental exam unchanged from preop  Yes    Nausea/Vomiting: none  Cardiovascular Status: acceptable, blood pressure returned to baseline and hemodynamically stable  Respiratory Status: acceptable  Postoperative Hydration acceptable  Comments: Wide awake, talking, calm.  Reports pain 9/10, \"same as preop, burning\".  Will treat, go low, go slow.  Voice and vision intact.  Able to move all extremities.      Arnoldo Burkett MD  12/9/2024 10:47 AM

## 2024-12-09 NOTE — OPERATIVE REPORT
Date of Surgery  12/09/24     Preoperative Diagnosis   L2-3 lumbar stenosis with neurogenic claudication  L3-4 lumbar stenosis with neurogenic claudication    Postoperative Diagnosis   L2-3 lumbar stenosis with neurogenic claudication  L3-4 lumbar stenosis with neurogenic claudication     Procedure Performed  L2-3 decompressive laminectomy and bilateral lateral recess decompression   L3-4 decompressive laminectomy and bilateral lateral recess decompression   Use of operative microscope and microsurgical technique  Use of biplanar fluoroscopy     Type of Anesthesia   General endotracheal      Indications   The patient is an 74-year-old female with L2-3 and L3-4 and spinal stenosis with neurogenic claudication and radiculopathy secondary to facet and ligamentum flavum hypertrophy. The pain has been limiting her activity of daily living. They no longer can tolerate their symptomatology. Their pain has been recalcitrant to nonoperative measures. Decision was collectively made to proceed with L2-3 and L3-4 decompressive laminectomy. The risks, benefits, complications, alternatives and anticipated postoperative course were discussed including but not limited to nerve root injury, dural tear, spinal instability with possible need for fusion surgery in the future, disc reherniation, need for future surgery, continued pain, cardiopulmonary complications, death, blindness, infection, seroma, hematoma.  All questions were answered. No guarantees were given.      Surgeon   Jaret Swanson,       Assistant(s)   Rigo Abel     He assisted with every aspect of the operation including, but not limited to, proper and safe positioning of the patient, obtaining adequate surgical exposure, manipulation of surgical instruments, the delicate task of providing suction to the surgical wound immediately adjacent to the neural elements, the delicate task of retraction of the thecal sac and neural elements, the continual process of  hemostasis during the procedure itself in addition to surgical wound closure and removal of the patient from the operating table and returning the patient back to the \[facility]\"". His assistance allowed me to perform the most sensitive and technical potions of this operation using 2 hands, thus enhancing patient safety. This would not be possible without the help of a skilled assistant familiar with the procedure and capable of safely performing the aforementioned tasks.      Findings   Severe spinal stenosis L2-3 and L3-4 secondary to facet and ligamentum flavum hypertrophy     Unanticipated Events/Complications  None     Technique/Description of Procedure  The patient was greeted in the preoperative holding area. The informed consent was reviewed and signed. Their back was marked with a marking pen. The patient was brought into the operating room. Preoperative antibiotics were administered and general anesthesia was induced by the anesthesia staff. Castillo was placed. The patient was then carefully turned to the prone position onto an Jem spine table. All bony prominences were well padded. It was ensured that there was no pressure upon the eyes and the arms were positioned in such a way to not stretch the brachial plexus bilaterally. Foam pads were placed beneath the bilateral arms. Small axillary rolls were placed. Foam pads were placed underneath the hips bilaterally. Foam pads were placed beneath the knees and the knees were flexed with pillows placed beneath them. SCDs were placed in the preoperative holding area and the SCD machine was reconnected. The skin was prepared in usual manner and the patient was draped in usual sterile fashion. A timeout was preformed identifying the correct patient, procedure, site and side.  A localizing fluoroscopic xray was obtained. Incision line was marked out in the midline.  Marcaine was injected at the incision site. Incision was made with a #10 blade scalpel thru the  skin. Dissection proceeded with Bovie cautery to the lumbodorsal fascia. Hemostasis was achieved along the way. The fascia was opened in the midline over the L2, L3, L4 spinous processes. Strict subperiosteal dissection was preformed bilaterally to expose the L2, L3 and L4 lamina, medial aspect of the facet joints and par interarticularis. The pars interarticularis and facet joints were identified and marked. An angled curette was placed and lateral C-arm fluoroscopy was utilized and confirmed the appropriate level. A Leksell rongeur was used to remove the inferior aspect of the spinous process of L2, the entirety of the L3 and the superior aspect of the spinous process of L4. The operative microscope was brought into the field. We began at the L3-4 level. A laminectomy was preformed bilaterally at the inferior aspect of L3 up to the insertion of the ligamentum flavum as well as the superior aspect of L4. A high-speed adina was used to thin the lamina, angled curettes were used to detach the ligamentum flavum, and Kerrison punches were used to remove the remainder of the lamina. Ligamentum flavum was split in the midline and cottonoids were placed. Kerrison rongeurs were utilized and the ligamentum flavum was taken down and bilateral medial facetectomies and foraminotomies were performed. There was pronounced facet and ligamentum hypertrophy causing severe central and bilateral lateral recess stenosis. Dissection was carried into the lateral recess bilaterally. Complete lateral recess decompression was performed at L3-4 bilaterally. A ball-ended dissector could now be placed ventral and dorsal to the nerve roots and out into the foramen and out around the L4 pedicles free of any compression of the nerve roots bilaterally. There was excellent decompression of the thecal sac. We then proceeded to the L2-3 level. A laminectomy was preformed bilaterally at the inferior aspect of L2 up to the insertion of the ligamentum  flavum as well as the superior aspect of L3. A high-speed adina was used to thin the lamina, angled curettes were used to detach the ligamentum flavum, and Kerrison punches were used to remove the remainder of the lamina. Ligamentum flavum was split in the midline and cottonoids were placed. Kerrison rongeurs were utilized and the ligamentum flavum was taken down and bilateral medial facetectomies and foraminotomies were performed. There was pronounced facet and ligamentum hypertrophy causing severe central and bilateral lateral recess stenosis. Dissection was carried into the lateral recess bilaterally. Complete lateral recess decompression was performed at L2-3 bilaterally. A ball-ended dissector could now be placed ventral and dorsal to the nerve roots and out into the foramen and out around the L3 pedicles free of any compression of the nerve roots bilaterally. There was excellent decompression of the thecal sac. Final x-rays confirmed the extent of the decompression. The wound was copiously irrigated. Hemostasis was achieved with bone wax on any bleeding bony edges and Floseal. Strict hemostasis was achieved. Vancomycin powder was massaged into the soft tissues. A subfascial Hemovac drain was placed thru a separate stab incision. The lumbodorsal fascia was repaired using 0 Vicryl suture. The superficial layers were closed using 0 and 2-0 Vicryl suture. The skin was closed with 3-0 Monocryl running subcuticular suture. The skin was sealed with skin glue and sterile dressing placed. The patient tolerated the procedure well. The patient was carefully turned back to the supine position onto a hospital cart. General anesthesia was reversed. There were no complications. All counts correct at the end of the case. Patient was taken to recovery room in stable condition.      Specimen   None sent     Tubes  Hemovac drain     Estimated Blood Loss   50 cc      Patient Condition/Disposition   Stable to PACU

## 2024-12-09 NOTE — PHYSICAL THERAPY NOTE
PHYSICAL THERAPY EVALUATION - INPATIENT     Room Number: Room 7/Room 7-A  Evaluation Date: 12/9/2024  Type of Evaluation: Initial   Physician Order: PT Eval and Treat    Presenting Problem: s/p L2-3 and L3-4 decompressive laminectomy on 12/9     Reason for Therapy: Mobility Dysfunction and Discharge Planning    PHYSICAL THERAPY ASSESSMENT   Patient is a 74 year old female admitted 12/9/2024 for L2-3 and L3-4 decompressive laminectomy.  Prior to admission, patient's baseline is independent with ADLs and functional mobility without assistive device.  Patient is currently functioning below baseline with bed mobility, transfers, gait, stair negotiation, standing prolonged periods, and performing household tasks.  Patient is requiring contact guard assist as a result of the following impairments: decreased functional strength, pain, impaired dynamic standing balance, decreased muscular endurance, medical status, and limited lumbar ROM.  Physical Therapy will continue to follow for duration of hospitalization.    Patient will benefit from continued skilled PT Services with no additional skilled services but increased support at home.    PLAN DURING HOSPITALIZATION  Nursing Mobility Recommendation : 1 Assist  PT Device Recommendation: Rolling walker  PT Treatment Plan: Bed mobility;Body mechanics;Endurance;Patient education;Energy conservation;Gait training;Stair training;Balance training;Transfer training;Strengthening  Rehab Potential : Good  Frequency (Obs): Daily     PHYSICAL THERAPY MEDICAL/SOCIAL HISTORY     Problem List  Active Problems:    * No active hospital problems. *    HOME SITUATION  Type of Home: Apartment  Home Layout: One level  Stairs to Enter : 32   Railing: Yes    Lives With: Spouse    Drives: Yes   Patient Regularly Uses: None     Prior Level of [place]: independent     SUBJECTIVE  Agreeable to activity.     PHYSICAL THERAPY EXAMINATION   OBJECTIVE  Precautions: Spine  Fall Risk: Standard fall  risk    WEIGHT BEARING RESTRICTION  none    PAIN ASSESSMENT  Rating:  (did not rate)  Location: lower back/surgical incision  Management Techniques: Activity promotion;Body mechanics;Breathing techniques;Repositioning (ice)    COGNITION  Overall Cognitive Status:  WFL - within functional limits    RANGE OF MOTION AND STRENGTH ASSESSMENT  Upper extremity ROM and strength are within functional limits.  Lower extremity ROM is within functional limits.  Lower extremity strength is within functional limits.    BALANCE  Static Sitting: Good  Dynamic Sitting: Fair +  Static Standing: Fair  Dynamic Standing: Fair -    AM-PAC '6-Clicks' INPATIENT SHORT FORM - BASIC MOBILITY  How much difficulty does the patient currently have...  Patient Difficulty: Turning over in bed (including adjusting bedclothes, sheets and blankets)?: A Little   Patient Difficulty: Sitting down on and standing up from a chair with arms (e.g., wheelchair, bedside commode, etc.): A Little   Patient Difficulty: Moving from lying on back to sitting on the side of the bed?: A Little   How much help from another person does the patient currently need...   Help from Another: Moving to and from a bed to a chair (including a wheelchair)?: A Little   Help from Another: Need to walk in hospital room?: A Little   Help from Another: Climbing 3-5 steps with a railing?: A Little     AM-PAC Score:  Raw Score: 18   Approx Degree of Impairment: 46.58%   Standardized Score (AM-PAC Scale): 43.63   CMS Modifier (G-Code): CK    FUNCTIONAL ABILITY STATUS  Functional Mobility/Gait Assessment  Gait Assistance: Contact guard assist  Distance (ft): 85  Assistive Device: Rolling walker  Pattern: Shuffle (slow, guarded, cues given for posture and proximity to RW)  Sit to Stand: contact guard assist    Exercise/Education Provided:  Education Provided To: Patient     Patient Education: Role of Physical Therapy;Plan of Care;DME Recommendations;Functional Transfer Techniques;Fall  Prevention;Surgical Precautions;Posture/Positioning;Energy Conservation;Proper Body Mechanics;Gait Training (spine precautions; log rolling; benefits of frequent ambulation)     Patient's Response to Education: Verbalized Understanding;Requires Further Education     Skilled Therapy Provided: Pt received sitting in chair; introduced self and role; agreeable to activity. C/o moderate lower back pain. Educated on spine precautions and log rolling. Demos STS transfer to/from chair with RW and CGA; hallway ambulation with RW and CGA, slow but steady gait. Cues given for upright posture and maintain proximity to RW. No LOB. Pt drowsy and with flat affect throughout session; may need re-education tomorrow. Pt was left sitting in chair with needs within reach, ice applied to lower back, handoff to RN complete.    The patient's Approx Degree of Impairment: 46.58% has been calculated based on documentation in the [facility] '6 clicks' Inpatient Basic Mobility Short Form.  Research supports that patients with this level of impairment may benefit from home with HH PT however anticipate no needs.  Final disposition will be made by interdisciplinary medical team.    Patient End of Session: Up in chair;Needs met;Call light within reach;RN aware of session/findings;All patient questions and concerns addressed;Hospital anti-slip socks;Ice applied    CURRENT GOALS  Goals to be met by: 12/16/24  Patient Goal Patient's self-stated goal is: go home   Goal #1 Patient is able to demonstrate supine - sit EOB @ level: supervision     Goal #1   Current Status    Goal #2 Patient is able to demonstrate transfers Sit to/from Stand at assistance level: supervision with walker - rolling     Goal #2  Current Status    Goal #3 Patient is able to ambulate 150 feet with assist device: walker - rolling at assistance level: supervision   Goal #3   Current Status    Goal #4 Patient will negotiate 15 stairs/one curb w/ assistive device and supervision   Goal  #4   Current Status    Goal #5 Patient to demonstrate independence with home activity/exercise instructions provided to patient in preparation for discharge.   Goal #5   Current Status    Goal #6    Goal #6  Current Status      Patient Evaluation Complexity Level:  History Low - no personal factors and/or co-morbidities   Examination of body systems Low -  addressing 1-2 elements   Clinical Presentation Low- Stable   Clinical Decision Making  Low Complexity     Therapeutic Activity:  23 minutes

## 2024-12-10 VITALS
SYSTOLIC BLOOD PRESSURE: 98 MMHG | RESPIRATION RATE: 18 BRPM | OXYGEN SATURATION: 94 % | BODY MASS INDEX: 22.32 KG/M2 | DIASTOLIC BLOOD PRESSURE: 61 MMHG | HEART RATE: 61 BPM | TEMPERATURE: 98 F | HEIGHT: 63 IN | WEIGHT: 126 LBS

## 2024-12-10 LAB
HCT VFR BLD AUTO: 27.9 %
HGB BLD-MCNC: 9.3 G/DL

## 2024-12-10 NOTE — PHYSICAL THERAPY NOTE
PHYSICAL THERAPY TREATMENT NOTE - INPATIENT     Room Number: Room 7/Room 7-A       Presenting Problem: s/p L2-3 and L3-4 decompressive laminectomy on 12/9       Problem List  Active Problems:    * No active hospital problems. *      PHYSICAL THERAPY ASSESSMENT   Patient demonstrates good  progress this session, goals  remain in progress.      Patient is requiring contact guard assist and minimal assist as a result of the following impairments: decreased functional strength, decreased endurance/aerobic capacity, and pain.     Patient continues to function below baseline with bed mobility, transfers, and gait.  Next session anticipate patient to progress bed mobility, transfers, and gait.  Physical Therapy will continue to follow patient for duration of hospitalization.    Patient continues to benefit from continued skilled PT services: with no additional skilled services but increased support at home.  Patient Goal Patient's self-stated goal is: go home   Goal #1 Patient is able to demonstrate supine - sit EOB @ level: supervision     Goal #1   Current Status Min a   Goal #2 Patient is able to demonstrate transfers Sit to/from Stand at assistance level: supervision with walker - rolling     Goal #2  Current Status Min a   Goal #3 Patient is able to ambulate 150 feet with assist device: walker - rolling at assistance level: supervision   Goal #3   Current Status Pt amb 2 x 50 ft with RW and CGA   Goal #4 Patient will negotiate 15 stairs/one curb w/ assistive device and supervision   Goal #4   Current Status Navigated 4 stairs with CGa   Goal #5 Patient to demonstrate independence with home activity/exercise instructions provided to patient in preparation for discharge.   Goal #5   Current Status In progress   Goal #6    Goal #6  Current Status      PLAN DURING HOSPITALIZATION  Nursing Mobility Recommendation : 1 Assist  PT Device Recommendation: Rolling walker  PT Treatment Plan: Bed mobility;Body  mechanics;Endurance;Gait training  Frequency (Obs): Daily     SUBJECTIVE  Pt reports being ready for PT RX    OBJECTIVE  Precautions: Spine    WEIGHT BEARING RESTRICTION       PAIN ASSESSMENT   Rating: Unable to rate  Location: lower back/surgical incision  Management Techniques: Activity promotion;Body mechanics;Breathing techniques;Repositioning (ice)    BALANCE  Static Sitting: Good  Dynamic Sitting: Fair +  Static Standing: Fair  Dynamic Standing: Fair -    ACTIVITY TOLERANCE                          O2 WALK       AM-PAC '6-Clicks' INPATIENT SHORT FORM - BASIC MOBILITY  How much difficulty does the patient currently have...  Patient Difficulty: Turning over in bed (including adjusting bedclothes, sheets and blankets)?: A Little   Patient Difficulty: Sitting down on and standing up from a chair with arms (e.g., wheelchair, bedside commode, etc.): A Little   Patient Difficulty: Moving from lying on back to sitting on the side of the bed?: A Little   How much help from another person does the patient currently need...   Help from Another: Moving to and from a bed to a chair (including a wheelchair)?: A Little   Help from Another: Need to walk in hospital room?: A Little   Help from Another: Climbing 3-5 steps with a railing?: A Little     AM-PAC Score:  Raw Score: 18   Approx Degree of Impairment: 46.58%   Standardized Score (AM-PAC Scale): 43.63   CMS Modifier (G-Code): CK    FUNCTIONAL ABILITY STATUS  Functional Mobility/Gait Assessment  Gait Assistance: Contact guard assist  Distance (ft): 2 x 50  Assistive Device: Rolling walker  Pattern: Shuffle  Stairs: Stairs  How Many Stairs: 4  Device: 2 Rails  Assist: Contact guard assist  Rolling: minimal assist  Supine to Sit: minimal assist  Sit to Supine: minimal assist  Sit to Stand: minimal assist    Skilled Therapy Provided: AM session.Min a for bed mobility and transfer;extra time provided to complete task.EOB sitting balance activity with emphasis on core  stabilization.Spinal precautions reviewed;education.Pt amb 2 x 50 ft with RW and CGa.provided cuing for gait pattern as well as for postural awareness.Navigated 4 stairs with CGa.There ex.    The patient's Approx Degree of Impairment: 46.58% has been calculated based on documentation in the [facility] '6 clicks' Inpatient Daily Activity Short Form.  Research supports that patients with this level of impairment may benefit from Home with increased family support..  Final disposition will be made by interdisciplinary medical team.    THERAPEUTIC EXERCISES  Lower Extremity Ankle pumps  Glut sets  Quad sets     Position Supine       Patient End of Session: Up in chair;Call light within reach;RN aware of session/findings;All patient questions and concerns addressed    CURRENT GOALS     Gait Training: 15 minutes  Therapeutic Activity: 15 minutes  Neuromuscular Re-education:  minutes  Therapeutic Exercise:  minutes  Canalith Repositioning:  minutes  Manual Therapy:  minutes  Can add/delete any of these

## 2024-12-10 NOTE — PROGRESS NOTES
Brief Ortho Progress Note    S: Patient doing well. Pain controlled. Ambulating with PT    O  Dressings CDI  Drain with minimal output  Strength and sensation preserved in the lower extremity  Distal extremity is perfused    A/P  S/P L2-3 and L3-4 decompressive laminectomy doing well  Post op abx completed  Remove hemovac drain today  Pain control  Incentive spirometry  PT/OT - up with assist - avoid excessive bending, lifting or twisting  Mechanical DVT ppx - please NO CHEMICAL DVT PPX  Discharge planning - Discharge to home today

## 2024-12-10 NOTE — PHYSICAL THERAPY NOTE
PHYSICAL THERAPY TREATMENT NOTE - INPATIENT     Room Number: Room 7/Room 7-A       Presenting Problem: s/p L2-3 and L3-4 decompressive laminectomy on 12/9       Problem List  Active Problems:    * No active hospital problems. *      PHYSICAL THERAPY ASSESSMENT   Patient demonstrates good  progress this session, goals  remain in progress.      Patient is requiring contact guard assist and minimal assist as a result of the following impairments: decreased functional strength, decreased endurance/aerobic capacity, and pain.     Patient continues to function below baseline with bed mobility, transfers, and gait.  Next session anticipate patient to progress bed mobility, transfers, and gait.  Physical Therapy will continue to follow patient for duration of hospitalization.    Patient continues to benefit from continued skilled PT services: with no additional skilled services but increased support at home.  Patient Goal Patient's self-stated goal is: go home   Goal #1 Patient is able to demonstrate supine - sit EOB @ level: supervision     Goal #1   Current Status Min a   Goal #2 Patient is able to demonstrate transfers Sit to/from Stand at assistance level: supervision with walker - rolling     Goal #2  Current Status Min a   Goal #3 Patient is able to ambulate 150 feet with assist device: walker - rolling at assistance level: supervision   Goal #3   Current Status Pt amb 2 x 50 ft with RW and CGA   Goal #4 Patient will negotiate 15 stairs/one curb w/ assistive device and supervision   Goal #4   Current Status Navigated 4 stairs with CGa   Goal #5 Patient to demonstrate independence with home activity/exercise instructions provided to patient in preparation for discharge.   Goal #5   Current Status In progress   Goal #6    Goal #6  Current Status      PLAN DURING HOSPITALIZATION  Nursing Mobility Recommendation : 1 Assist  PT Device Recommendation: Rolling walker  PT Treatment Plan: Bed mobility;Body  mechanics;Endurance;Gait training  Frequency (Obs): Daily     SUBJECTIVE  Pt reports being ready for PT RX    OBJECTIVE  Precautions: Spine    WEIGHT BEARING RESTRICTION       PAIN ASSESSMENT   Rating: Unable to rate  Location: lower back/surgical incision  Management Techniques: Activity promotion;Body mechanics;Breathing techniques;Repositioning (ice)    BALANCE  Static Sitting: Good  Dynamic Sitting: Fair +  Static Standing: Fair  Dynamic Standing: Fair -    ACTIVITY TOLERANCE                          O2 WALK       AM-PAC '6-Clicks' INPATIENT SHORT FORM - BASIC MOBILITY  How much difficulty does the patient currently have...  Patient Difficulty: Turning over in bed (including adjusting bedclothes, sheets and blankets)?: A Little   Patient Difficulty: Sitting down on and standing up from a chair with arms (e.g., wheelchair, bedside commode, etc.): A Little   Patient Difficulty: Moving from lying on back to sitting on the side of the bed?: A Little   How much help from another person does the patient currently need...   Help from Another: Moving to and from a bed to a chair (including a wheelchair)?: A Little   Help from Another: Need to walk in hospital room?: A Little   Help from Another: Climbing 3-5 steps with a railing?: A Little     AM-PAC Score:  Raw Score: 18   Approx Degree of Impairment: 46.58%   Standardized Score (AM-PAC Scale): 43.63   CMS Modifier (G-Code): CK    FUNCTIONAL ABILITY STATUS  Functional Mobility/Gait Assessment  Gait Assistance: Contact guard assist  Distance (ft): 2 x 100  Assistive Device: Rolling walker  Pattern: Shuffle  Stairs: Stairs  How Many Stairs: 12  Device: 2 Rails  Assist: Contact guard assist  Rolling: minimal assist  Supine to Sit: minimal assist  Sit to Supine: minimal assist  Sit to Stand: minimal assist    Skilled Therapy Provided: AM session.Min a for bed mobility and transfer;extra time provided to complete task.EOB sitting balance activity with emphasis on core  stabilization.Spinal precautions reviewed;education.Pt amb 2 x 100 ft with RW and CGa.provided cuing for gait pattern as well as for postural awareness.Navigated 12 stairs with CGa.There ex.    The patient's Approx Degree of Impairment: 46.58% has been calculated based on documentation in the [facility] '6 clicks' Inpatient Daily Activity Short Form.  Research supports that patients with this level of impairment may benefit from Home with increased family support..  Final disposition will be made by interdisciplinary medical team.    THERAPEUTIC EXERCISES  Lower Extremity Ankle pumps  Glut sets  Quad sets     Position Supine       Patient End of Session: Up in chair;Call light within reach;RN aware of session/findings;All patient questions and concerns addressed    CURRENT GOALS     Gait Training: 15 minutes  Therapeutic Activity: 15 minutes  Neuromuscular Re-education:  minutes  Therapeutic Exercise:  minutes  Canalith Repositioning:  minutes  Manual Therapy:  minutes  Can add/delete any of these

## 2024-12-10 NOTE — OCCUPATIONAL THERAPY NOTE
OCCUPATIONAL THERAPY EVALUATION - INPATIENT     Room Number: Room 7/Room 7-A  Evaluation Date: 12/10/2024  Type of Evaluation: Initial  Presenting Problem: s/p L2-3 and L3-4 decompressive laminectomy on 12/9    Physician Order: IP Consult to Occupational Therapy  Reason for Therapy: ADL/IADL Dysfunction and Discharge Planning    OCCUPATIONAL THERAPY ASSESSMENT   Patient is a 74 year old female admitted 12/9/2024 for s/p L2-3 and L3-4 decompressive laminectomy on 12/9.  Prior to admission, patient's baseline is independent ADLS, TFRs, functional mobility.  Patient is currently functioning below baseline with ADLs and functional mobility/TFRs.  Patient is requiring up to min A for LB ADLs, SBA TFRS/functional mobility as a result of the following impairments: decreased endurance, pain, limited   ROM, and limitations form surgical precautions . Occupational Therapy will continue to follow for duration of hospitalization.    Patient will benefit from continued skilled OT Services with no additional skilled services but increased support at home.    PLAN DURING HOSPITALIZATION  OT Device Recommendations: Shower chair  OT Treatment Plan: Balance activities;Energy conservation/work simplification techniques;ADL training;Endurance training;UE strengthening/ROM     OCCUPATIONAL THERAPY MEDICAL/SOCIAL HISTORY   Problem List   Active Problems:    * No active hospital problems. *    HOME SITUATION  Type of Home: Apartment  Home Layout: -- (3rd floor walk up)  Lives With: Spouse  Toilet and Equipment: Standard height toilet  Shower/Tub and Equipment: Tub-shower combo  Other Equipment: Reacher  Drives: No  Patient Regularly Uses: Other (Comment) (no AD at baseline. owns 2ww)      SUBJECTIVE  \"This will be tricky to change how I do things but my  will help\"    OCCUPATIONAL THERAPY EXAMINATION   OBJECTIVE  Precautions: Spine  Fall Risk: Standard fall risk    WEIGHT BEARING RESTRICTION     PAIN ASSESSMENT  Rating:  3  Location: low back  Management Techniques: Activity promotion; Body mechanics; Relaxation      COGNITION  Overall Cognitive Status:  WFL - within functional limits    RANGE OF MOTION   Upper extremity ROM is within functional limits     STRENGTH ASSESSMENT  Upper extremity strength is within functional limits     ACTIVITIES OF DAILY LIVING ASSESSMENT  AM-PAC ‘6-Clicks’ Inpatient Daily Activity Short Form  How much help from another person does the patient currently need…  -   Putting on and taking off regular lower body clothing?: A Little  -   Bathing (including washing, rinsing, drying)?: A Little  -   Toileting, which includes using toilet, bedpan or urinal? : A Little  -   Putting on and taking off regular upper body clothing?: A Little  -   Taking care of personal grooming such as brushing teeth?: A Little  -   Eating meals?: None    AM-PAC Score:  Score: 19  Approx Degree of Impairment: 42.8%  Standardized Score (AM-PAC Scale): 40.22  CMS Modifier (G-Code): CK    BED MOBILITY  Rolling: stand-by assist  Supine to Sit: stand-by assist  Sit to Supine: stand-by assist  Comments:    Cues for log roll technique    FUNCTIONAL TRANSFER ASSESSMENT  Sit to Stand from EOB: stand-by assist  Toilet Transfer: stand-by assist  Comments:    Intermittent 2ww use. Pt has preference for no device, SBA    FUNCTIONAL MOBILITY  stand-by assist for in-room fx mobility   Comments:     Initial 2ww use, trials w/o SBA    ACTIVITIES OF DAILY LIVING  Eating: independent  Grooming: stand-by assist, standing at sink ~8 mins  LB Dressing: min assist, unable to achieve figure 4 LLE  Toileting: stand-by assist  UB bathing: SBA standing at sink  Comments:     Graded based on abilities during session and clinical judgement. Pt demos abilities with: bathing, toileting, LB dressing, grooming. EDU on spine precaution and task modification for all ADLs. Report support and home and understanding of info. Cues for no bending and twist throughout.         Skilled Therapy Provided: Educated pt about role of OT and POC. Reviewed spinal precautions and brace wear schedule with pt. Pt able to recall 3/3 precautions. Educated pt about adherence to spinal precautions during ADLs and functional transfers as well as log rolling techniques and appropriate AE/DME. Pt verbalized/demonstrated understanding.      EDUCATION PROVIDED  Patient Education : Role of Occupational Therapy; Plan of Care; Discharge Recommendations; DME Recommendations; Functional Transfer Techniques; Fall Prevention; Surgical Precautions; Energy Conservation; Posture/Positioning; Edema Reduction; Proper Body Mechanics  Patient's Response to Education: Verbalized Understanding    The patient's Approx Degree of Impairment: 42.8% has been calculated based on documentation in the [facility] '6 clicks' Inpatient Daily Activity Short Form.  Research supports that patients with this level of impairment may benefit from [facility].  Final disposition will be made by interdisciplinary medical team.    Patient End of Session: In bed;Needs met;Call light within reach;RN aware of session/findings;Hospital anti-slip socks;All patient questions and concerns addressed;Ice applied    OT Goals  Patient self-stated goal is: \"get back to walking\"     Patient will complete LE dressing with mod I AE PRN  Comment:     Patient will complete toilet transfer with mod I  Comment:     Patient will complete self care task at sink level with mod  I   Comment:    Patient will independently demo adherence to spine precautions during ADL task w /o cues  Comment:         Goals  on: 24   Frequency:3-5xs/week    Patient Evaluation Complexity Level:   Occupational Profile/Medical History LOW - Brief history including review of medical or therapy records    Specific performance deficits impacting engagement in ADL/IADL LOW  1 - 3 performance deficits    Client Assessment/Performance Deficits LOW - No comorbidities nor modifications of  tasks    Clinical Decision Making LOW - Analysis of occupational profile, problem-focused assessments, limited treatment options    Overall Complexity LOW     OT Session Time: 25 minutes  Self-Care Home Management: 25 minutes

## 2024-12-10 NOTE — DISCHARGE INSTRUCTIONS
She can take her dressing off either tomorrow night or Thursday morning.   She can shower there after.   No soaking the incision in a tub for four weeks

## (undated) DEVICE — MICRO KOVER: Brand: UNBRANDED

## (undated) DEVICE — LAMINECTOMY: Brand: MEDLINE INDUSTRIES, INC.

## (undated) DEVICE — Device

## (undated) DEVICE — CONTAINER,SPECIMEN,OR STERILE,4OZ: Brand: MEDLINE

## (undated) DEVICE — FORCEP CUSH BAY BP DISP 7.5IN

## (undated) DEVICE — SUTURE SILK 2-0 SA85H

## (undated) DEVICE — INSULATED BLADE ELECTRODE: Brand: EDGE

## (undated) DEVICE — SUTURE VICRYL 0 UR-6

## (undated) DEVICE — VIOLET BRAIDED (POLYGLACTIN 910), SYNTHETIC ABSORBABLE SUTURE: Brand: COATED VICRYL

## (undated) DEVICE — SUTURE MONOCRYL 3-0 Y936H

## (undated) DEVICE — SUT STRATAFIX SYMMTRC PDS + 0 18IN ABSRB

## (undated) DEVICE — GAUZE SPONGES,12 PLY: Brand: CURITY

## (undated) DEVICE — EXOFIN TISSUE ADHESIVE 1.0ML

## (undated) DEVICE — SPONGE: SPECIALTY PEANUT XR 100/CS: Brand: MEDICAL ACTION INDUSTRIES

## (undated) DEVICE — 60 ML SYRINGE LUER-LOCK TIP: Brand: MONOJECT

## (undated) DEVICE — SUTURE VICRYL 2-0 CT-2

## (undated) DEVICE — 11.1-M5 MULTIMODALITY KIT 5

## (undated) DEVICE — TRAY CATH FOLEY 16FR INCLUDE BARDX IC COMPLT CARE

## (undated) DEVICE — KIT ACCESS MAXCESS 4

## (undated) DEVICE — SPONGE 4X4 10PK

## (undated) DEVICE — PEN: MARKING STD PT 100/CS: Brand: MEDICAL ACTION INDUSTRIES

## (undated) DEVICE — BANDAGE ROLL,100% COTTON, 6 PLY, LARGE: Brand: KERLIX

## (undated) DEVICE — GAMMEX® PI HYBRID SIZE 7, STERILE POWDER-FREE SURGICAL GLOVE, POLYISOPRENE AND NEOPRENE BLEND: Brand: GAMMEX

## (undated) DEVICE — ANTERIOR POSTERIOR ADD ON PACK: Brand: MEDLINE INDUSTRIES, INC.

## (undated) DEVICE — SUTURE MONOCRYL 3-0 Y497G

## (undated) DEVICE — FLOSEAL HEMOSTATIC MATRIX, 5ML: Brand: FLOSEAL HEMOSTATIC MATRIX

## (undated) DEVICE — SPONGE LAP 18X18IN 7IN LOOP

## (undated) DEVICE — KIT EVAC 400CC DIA1/8IN H PAT 12.5IN 3 SPR

## (undated) DEVICE — SUTURE PDS II 0 CTX

## (undated) DEVICE — SNAP KOVER: Brand: UNBRANDED

## (undated) DEVICE — 3.0MM PRECISION NEURO (MATCH HEAD)

## (undated) DEVICE — SUTURE SILK 2-0 SA65H

## (undated) DEVICE — GAMMEX® NON-LATEX PI ORTHO SIZE 7.5, STERILE POLYISOPRENE POWDER-FREE SURGICAL GLOVE: Brand: GAMMEX

## (undated) DEVICE — NV CLIP DSC&IN-LINE ACTIVATOR

## (undated) DEVICE — X-RAY DETECTABLE SPONGES,16 PLY: Brand: VISTEC

## (undated) DEVICE — CATHETER,URETHRAL,REDRUBBER,STRL,16FR: Brand: MEDLINE

## (undated) DEVICE — MEDI-VAC NON-CONDUCTIVE SUCTION TUBING: Brand: CARDINAL HEALTH

## (undated) DEVICE — LIGACLIP MCA MULTIPLE CLIP APPLIERS, 20 LARGE CLIPS: Brand: LIGACLIP

## (undated) DEVICE — DRESSING HYDRCOLL 3.25X6IN TRNSLUC

## (undated) DEVICE — APPLIER LICACLIP MCA MED 23.8

## (undated) DEVICE — GAMMEX® PI HYBRID SIZE 7.5, STERILE POWDER-FREE SURGICAL GLOVE, POLYISOPRENE AND NEOPRENE BLEND: Brand: GAMMEX

## (undated) DEVICE — SPK10329 JACKSON KIT: Brand: SPK10329 JACKSON KIT

## (undated) DEVICE — ADHESIVE SKIN TOP FOR WND CLSR DERMBND ADV

## (undated) DEVICE — SUT COAT VCRL+ 0 27IN UR-6 ABSRB VLT ANTIBACT

## (undated) DEVICE — DRAPE SHEET LG

## (undated) DEVICE — NEEDLE SPNL 18GA L3.5IN W/ QNCKE SHARPER BVL

## (undated) DEVICE — SPINOCAN® 18 GA. X 3-1/2 IN. (90 MM) SPINAL NEEDLE: Brand: SPINOCAN®

## (undated) DEVICE — INSULATED BLADE ELECTRODE 6.5

## (undated) DEVICE — C-ARMOR C-ARM EQUIPMENT COVERS CLEAR STERILE UNIVERSAL FIT 12 PER CASE: Brand: C-ARMOR

## (undated) DEVICE — COVER SGL STRL LGHT HNDL BLU

## (undated) DEVICE — SUT STRATAFIX SYMMTRC PDS+ 0 18IN CT-2 ABSRB

## (undated) DEVICE — CAUTERY BLADE 2IN INS E1455

## (undated) DEVICE — E-Z CLEAN, NON-STICK, PTFE COATED, ELECTROSURGICAL BLADE ELECTRODE, MODIFIED EXTENDED INSULATION, 6.5 INCH (16.5 CM): Brand: MEGADYNE

## (undated) DEVICE — SUTURE VICRYL 3-0 SH

## (undated) DEVICE — ANTI-FOG KIT: Brand: FRED 2

## (undated) DEVICE — EXOFIN TISSUE ADHESIVE .5ML

## (undated) DEVICE — CHLORAPREP ORANGE TINT 10.5ML

## (undated) DEVICE — 3M™ TEGADERM™ TRANSPARENT FILM DRESSING FRAME STYLE, 1626W, 4 IN X 4-3/4 IN (10 CM X 12 CM), 50/CT 4CT/CASE: Brand: 3M™ TEGADERM™

## (undated) DEVICE — 3M™ TEGADERM™ TRANSPARENT FILM DRESSING, 1626W, 4 IN X 4-3/4 IN (10 CM X 12 CM), 50 EACH/CARTON, 4 CARTON/CASE: Brand: 3M™ TEGADERM™

## (undated) DEVICE — NVM5 PROBE SNGL USE STER PKG

## (undated) DEVICE — DISTRACTOR PIN, 12MM

## (undated) DEVICE — SYRINGE MED 10ML LL TIP W/O SFTY DISP

## (undated) DEVICE — SUT VCRL 2-0 18IN ABSRB UD CR L26MM CT-2

## (undated) DEVICE — SUTURE MONOCRYL 4-0 PS-2

## (undated) DEVICE — SUT MCRYL 3-0 27IN ABSRB UD L24MM PS-1

## (undated) DEVICE — PACK CDS LAMINECTOMY

## (undated) DEVICE — GOWN SURG AERO BLUE PERF XLG

## (undated) DEVICE — WIRE FX PRCPT KRSH BVL BLNT

## (undated) DEVICE — SOL  .9 1000ML BTL

## (undated) DEVICE — 3M™ IOBAN™ 2 ANTIMICROBIAL INCISE DRAPE 6650EZ: Brand: IOBAN™ 2

## (undated) DEVICE — KIT HEMSTAT MTRX 8ML PORCINE GEL HUM THROM

## (undated) DEVICE — NV I-PAS III BEVELED TIP STER

## (undated) DEVICE — KIT PATIENT CARE SPINAL TABLE

## (undated) DEVICE — CERVICAL CDS: Brand: MEDLINE INDUSTRIES, INC.

## (undated) NOTE — LETTER
[address], [facility]   Date:   4/21/2022     Name:   Chiki Blake    YOB: 1950   MRN:   [id number]       WHERE IS YOUR PAIN NOW? Parviz the areas on your body where you feel the described sensations. Use the appropriate symbol. Doris Mandes the areas of radiation. Include all affected areas. Just to complete the picture, please draw in the face. ACHE:  ^ ^ ^   NUMBNESS:  0000   PINS & NEEDLES:  = = = =                              ^ ^ ^                       0000              = = = =                                    ^ ^ ^                       0000            = = = =      BURNING:  XXXX   STABBING: ////                  XXXX                ////                         XXXX          ////     Please parviz the line below indicating your degree of pain right now  with 0 being no pain 10 being the worst pain possible.                                          0             1             2              3             4              5              6              7             8             9             10         Patient Signature:

## (undated) NOTE — LETTER
[address] [place] [address] Rd, [place], [place]       AUTHORIZATION FOR SURGICAL OPERATION OR PROCEDURE    1.  I hereby authorize Dr. Ayala Alexis, Lenin Paige MD  my Physician(s) and whomever may be designated as the doctor's hemolytic reactions,         transmission of diseases such as hepatitis, AIDS, cytomegalovirus (CMV),and fluid overload.  In the event that I wish         to have an autologous transfusion of my own blood, or a directed donor transfusion, I will discuss [facility] (Time)       _________________________________________________   __________________________________________  (Responsible person in case of minor or unconscious patient)   (Relationship to Patient)      _________________________

## (undated) NOTE — LETTER
[address], [place], [place]       AUTHORIZATION FOR SURGICAL OPERATION OR PROCEDURE    1.  I hereby authorize Dr. Abena Butts DO, Hobson Amaya  my Physician(s) and whomever may be designated as the doctor's As transmission of diseases such as hepatitis, AIDS, cytomegalovirus (CMV),and fluid overload. In the event that I wish         to have an autologous transfusion of my own blood, or a directed donor transfusion, I will discuss this with my         Physician. (Time)       _________________________________________________   __________________________________________  (Responsible person in case of minor or unconscious patient)   (Relationship to Patient)      _____________________________________________________

## (undated) NOTE — LETTER
[address] [place] [address]      Authorization for Surgical Operation and Procedure     Date:___________                                                                                                         Time:_______ or blood products by collecting agencies, I may still be subject to ill effects as a result of receiving a blood transfusion and/or blood products.   The following are some, but not all, of the potential risks that can occur: fever and allergic reactions, h operating room, procedural suite, and during the recovery period unless otherwise explicitly stated by me (or a person authorized to consent on my behalf).  The surgeon or my attending physician will determine when the applicable recovery period ends for pu financial interest in any product or implant, or other significant relationship used in this procedure/surgery, I have disclosed this and had a discussion with my patient.     _______________________________________________________________ ________________

## (undated) NOTE — Clinical Note
Dear Kayla Mclaughlin,    Thank you for sending Corrine Marinelli to see me for electrodiagnostic consultation. I appreciate your confidence in me to care for your patients. Please feel free call or text me at [phone] or contact me through Epic.     Sincerely,  Dusty Robbins MD  Board Certified, Physical Medicine and Rehabilitation  Board certified, [facility] [place]

## (undated) NOTE — LETTER
[address], [facility]   Date:   3/24/2022     Name:   Julianne Pardo    YOB: 1950   MRN:   [id number]       WHERE IS YOUR PAIN NOW? Dian the areas on your body where you feel the described sensations. Use the appropriate symbol. Joanie Canal the areas of radiation. Include all affected areas. Just to complete the picture, please draw in the face. ACHE:  ^ ^ ^   NUMBNESS:  0000   PINS & NEEDLES:  = = = =                              ^ ^ ^                       0000              = = = =                                    ^ ^ ^                       0000            = = = =      BURNING:  XXXX   STABBING: ////                  XXXX                ////                         XXXX          ////     Please dian the line below indicating your degree of pain right now  with 0 being no pain 10 being the worst pain possible.                                          0             1             2              3             4              5              6              7             8             9             10         Patient Signature:

## (undated) NOTE — Clinical Note
Dear [employer],    Thank you for sending Julianne Pardo to see me for physiatry consultation. I appreciate your confidence in me to care for your patients. Please feel free call me with any questions at [phone] or contact me through [address] [place] [address] Rd.     Sincerely,  Isabela Brizuela MD  Board Certified, Physical Medicine and Rehabilitation  Board certified, [facility] [place]